# Patient Record
Sex: MALE | Race: WHITE | Employment: UNEMPLOYED | URBAN - NONMETROPOLITAN AREA
[De-identification: names, ages, dates, MRNs, and addresses within clinical notes are randomized per-mention and may not be internally consistent; named-entity substitution may affect disease eponyms.]

---

## 2018-09-24 ENCOUNTER — HOSPITAL ENCOUNTER (EMERGENCY)
Age: 48
Discharge: HOME | End: 2018-09-24
Attending: EMERGENCY MEDICINE
Payer: MEDICAID

## 2018-09-24 ENCOUNTER — APPOINTMENT (OUTPATIENT)
Dept: GENERAL RADIOLOGY | Age: 48
End: 2018-09-24
Payer: MEDICAID

## 2018-09-24 VITALS
HEIGHT: 72 IN | OXYGEN SATURATION: 95 % | DIASTOLIC BLOOD PRESSURE: 95 MMHG | TEMPERATURE: 99 F | HEART RATE: 102 BPM | WEIGHT: 285 LBS | RESPIRATION RATE: 20 BRPM | BODY MASS INDEX: 38.6 KG/M2 | SYSTOLIC BLOOD PRESSURE: 151 MMHG

## 2018-09-24 DIAGNOSIS — M79.671 FOOT PAIN, BILATERAL: ICD-10-CM

## 2018-09-24 DIAGNOSIS — M79.672 FOOT PAIN, BILATERAL: ICD-10-CM

## 2018-09-24 DIAGNOSIS — M79.5 FOREIGN BODY (FB) IN SOFT TISSUE: Primary | ICD-10-CM

## 2018-09-24 PROCEDURE — 99283 EMERGENCY DEPT VISIT LOW MDM: CPT

## 2018-09-24 PROCEDURE — 73620 X-RAY EXAM OF FOOT: CPT

## 2018-09-24 ASSESSMENT — PAIN DESCRIPTION - LOCATION
LOCATION: FOOT
LOCATION: FOOT

## 2018-09-24 ASSESSMENT — PAIN DESCRIPTION - ORIENTATION
ORIENTATION: RIGHT;LEFT
ORIENTATION: RIGHT;LEFT

## 2018-09-24 ASSESSMENT — PAIN DESCRIPTION - ONSET
ONSET: ON-GOING
ONSET: ON-GOING

## 2018-09-24 ASSESSMENT — PAIN DESCRIPTION - PAIN TYPE
TYPE: ACUTE PAIN
TYPE: ACUTE PAIN

## 2018-09-24 ASSESSMENT — PAIN DESCRIPTION - PROGRESSION: CLINICAL_PROGRESSION: GRADUALLY IMPROVING

## 2018-09-24 ASSESSMENT — PAIN DESCRIPTION - DESCRIPTORS
DESCRIPTORS: STABBING
DESCRIPTORS: STABBING;SHARP

## 2018-09-24 ASSESSMENT — PAIN - FUNCTIONAL ASSESSMENT: PAIN_FUNCTIONAL_ASSESSMENT: 0-10

## 2018-09-24 ASSESSMENT — PAIN DESCRIPTION - FREQUENCY
FREQUENCY: INTERMITTENT
FREQUENCY: CONTINUOUS

## 2018-09-24 ASSESSMENT — PAIN SCALES - GENERAL
PAINLEVEL_OUTOF10: 8
PAINLEVEL_OUTOF10: 5

## 2018-09-25 NOTE — ED PROVIDER NOTES
[facility]  eMERGENCY dEPARTMENT eNCOUnter        Pt Name: Shaneka Perez  MRN: [id number]  [place] 1970  Date of evaluation: 9/24/2018  Provider: Carin Bedolla MD  PCP: No primary care provider on file. CHIEF COMPLAINT       Chief Complaint   Patient presents with    Foreign Body     glass in bilateral feet per pt, was seen at [place] earlier this evening       [facility]      Shaneka Perez is a 51 y.o. male  presents with oral foot pain and possible glass in his feet. Stepped on glass several days ago. He has pain in both feet. History of neuropathy in both feet. No discharge. No redness. Seen at the ER earlier today where they try to take out pieces of glass from his foot. Tums or worse when he walks steps. Pain is severe. Nursing notes at the time of the HPI were reviewed. Reviewed notes in the EMR. REVIEW OF SYSTEMS       Pertinent positives and negatives as above per history of present illness. Other systems reviewed and found to be negative to a total of 5 systems reviewed. PAST MEDICAL HISTORY     Past Medical History:   Diagnosis Date    Diabetes mellitus ([address]     Hyperlipidemia     Hypertension          SURGICAL HISTORY       Past Surgical History:   Procedure Laterality Date    CARPAL TUNNEL RELEASE      CYST INCISION AND DRAINAGE      ROTATOR CUFF REPAIR Bilateral          CURRENT MEDICATIONS       Previous Medications    OXYCODONE (ROXICODONE) 5 MG IMMEDIATE RELEASE TABLET    Take 1-2 tablets PO every 4 hours as needed for pain. ALLERGIES     Toradol [ketorolac tromethamine]    FAMILY HISTORY     History reviewed. No pertinent family history.        SOCIAL HISTORY       Social History     Social History    Marital status: Single     Spouse name: N/A    Number of children: N/A    Years of education: N/A     Social History Main Topics    Smoking status: Never Smoker    Smokeless tobacco: Current User glass.  No glass identified using surgical light and magnification. No signs of infection. Signs that foreign bodies were removed or an attempt was made to remove them. Do not feel that patient benefit from blind ×2 remove foreign body. I am unable to locate any foreign body and can see the effects of previous attempts today. Likely to harm patient and not benefit the patient. Recommended that he soak his feet in warm Epsom salts and follow-up with his podiatrist.  Return for signs of infection. Patient may develop infection if he has a foreign body present. Course:  1. No significant change. Felt better after irrigation of feet. Discussed all test results, treatment, clinical impression with patient. Discussed plan of care. Agreed with plan. CRITICAL CARE TIME     N/A       CONSULTS    None      CLINICAL IMPRESSION      1. Foreign body (FB) in soft tissue    2.  Foot pain, bilateral              DISPOSITION:    HOme    PATIENT REFERRED TO:  [facility] G[facility]vara [facility]  [address] , [address]  [phone]            DISCHARGE MEDICATIONS:  New Prescriptions    No medications on file              (Please note that portions of this note were completed with a voice recognition program.  Efforts were made to edit the dictations but occasionally words are mis-transcribed.)    Antonio Avelar MD (electronically signed)             Antonio Avelar MD  09/24/18 0406

## 2019-06-20 ENCOUNTER — APPOINTMENT (OUTPATIENT)
Dept: GENERAL RADIOLOGY | Age: 49
End: 2019-06-20
Payer: MEDICARE

## 2019-06-20 ENCOUNTER — HOSPITAL ENCOUNTER (EMERGENCY)
Age: 49
Discharge: HOME | End: 2019-06-20
Payer: MEDICARE

## 2019-06-20 VITALS
SYSTOLIC BLOOD PRESSURE: 142 MMHG | HEIGHT: 73 IN | HEART RATE: 95 BPM | TEMPERATURE: 98.2 F | BODY MASS INDEX: 37.11 KG/M2 | WEIGHT: 280 LBS | RESPIRATION RATE: 16 BRPM | DIASTOLIC BLOOD PRESSURE: 94 MMHG

## 2019-06-20 DIAGNOSIS — M25.571 PAIN AND SWELLING OF RIGHT ANKLE: ICD-10-CM

## 2019-06-20 DIAGNOSIS — S93.401A SPRAIN OF RIGHT ANKLE, UNSPECIFIED LIGAMENT, INITIAL ENCOUNTER: Primary | ICD-10-CM

## 2019-06-20 DIAGNOSIS — M25.471 PAIN AND SWELLING OF RIGHT ANKLE: ICD-10-CM

## 2019-06-20 PROCEDURE — 73610 X-RAY EXAM OF ANKLE: CPT

## 2019-06-20 PROCEDURE — 99283 EMERGENCY DEPT VISIT LOW MDM: CPT

## 2019-06-20 ASSESSMENT — PAIN DESCRIPTION - PROGRESSION: CLINICAL_PROGRESSION: NOT CHANGED

## 2019-06-20 ASSESSMENT — PAIN SCALES - GENERAL
PAINLEVEL_OUTOF10: 6
PAINLEVEL_OUTOF10: 7

## 2019-06-20 ASSESSMENT — PAIN DESCRIPTION - PAIN TYPE: TYPE: ACUTE PAIN

## 2019-06-20 ASSESSMENT — PAIN DESCRIPTION - ORIENTATION: ORIENTATION: RIGHT

## 2019-06-20 ASSESSMENT — PAIN DESCRIPTION - DESCRIPTORS: DESCRIPTORS: ACHING

## 2019-06-20 ASSESSMENT — PAIN DESCRIPTION - ONSET: ONSET: ON-GOING

## 2019-06-20 ASSESSMENT — PAIN DESCRIPTION - LOCATION: LOCATION: ANKLE

## 2019-06-20 ASSESSMENT — PAIN DESCRIPTION - FREQUENCY: FREQUENCY: CONTINUOUS

## 2019-06-20 NOTE — ED NOTES
Pt instructed to follow up with [facility]. Assessed per Linda Traylor NP.      Gilberto Manning LPN  54/51/29 0586

## 2019-06-20 NOTE — ED PROVIDER NOTES
malleolus. There is no knee tenderness or fibular head tenderness. The ankle joint is intact without excessive opening on stressing. ROM to the right ankle is with limited flexion and extension due to pain. Distal pulses palpable. Capillary refill less than 3 seconds. Full sensation is intact. SKIN: Warm and dry. Skin is intact, no open wounds observed. NEUROLOGICAL: Alert and oriented. Sensation is intact to light touch to the distal tips of the toes on the right foot. PSYCHIATRIC: Mood and affect appropriate. Speech is clear and articulate. LABS  No results found for this visit on 06/20/19. RADIOLOGY  Xr Ankle Left (min 3 Views)    Result Date: 6/20/2019  EXAMINATION: 3 XRAY VIEWS OF THE LEFT ANKLE 6/20/2019 4:23 pm COMPARISON: None. HISTORY: ORDERING SYSTEM PROVIDED HISTORY: pain TECHNOLOGIST PROVIDED HISTORY: Reason for exam:->pain Ordering Physician Provided Reason for Exam: high ankle pain Acuity: Acute Type of Exam: Initial FINDINGS: Frontal, lateral, and oblique views of the ankle are submitted for review. There is no evidence for acute fracture or dislocation. Small calcification anterior to the tibiotalar joint space, possibly intra-articular body. Os trigonum variant noted. Small dorsal calcaneal spur. No acute osseus abnormality of the ankle. No fracture or dislocation. ED COURSE/MDM  Patient seen and evaluated. Old records reviewed. Diagnostic testing reviewed and results discussed. I have evaluated this patient. My supervising physician was available for consultation. Francheska Mcpherson presented to the ED today with above noted complaints. Patient appears well, vital signs are normal. Right ankle physical exam shows swelling and tenderness to the lateral ankle. X-ray obtained and shows no acute findings. I feel patient's symptoms are most consistent with an ankle sprain.  Patient reports that he was given an Ace wrap, he has crutches at home but was not given an Aircast. Patient be placed into an Aircast here in the ED. He will be given follow-up with orthopedics as he may benefit from physical therapy. Under my direction a air cast and ace wrap was placed on the right leg by the ED tech. I have examined the splint thoroughly for functionality. Extremity is distally neurovascularly intact with movement of digits, normal sensation and brisk cap refill. We discussed splint precautions including development of worsening swelling, pain, numbness, tingling, or weakness. At this point I do not feel the patient requires further work up and it is reasonable to discharge the patient. Please refer to AVS for further details of the discharge instructions. A discussion was had with the patient regarding diagnosis, diagnostic testing results, treatment/ plan of care, and follow up. All questions were answered. Patient will follow up as directed for further evaluation/treatment. The patient was given strict return precautions as we discussed symptoms that would necessitate return to the ED. Patient will return to ED for new/worsening symptoms. The patient verbalized their understanding and agreement with the above plan. I estimate there is LOW risk for COMPARTMENT SYNDROME, DEEP VENOUS THROMBOSIS, SEPTIC ARTHRITIS, TENDON OR NEUROVASCULAR INJURY, thus I consider the discharge disposition reasonable. Michael Flanagan and I have discussed the diagnosis and risks, and we agree with discharging home to follow-up with their primary doctor or the referral orthopedist. We also discussed returning to the Emergency Department immediately if new or worsening symptoms occur. We have discussed the symptoms which are most concerning (e.g., changing or worsening pain, numbness, weakness) that necessitate immediate return. Clinial Impression    1. Sprain of right ankle, unspecified ligament, initial encounter    2.  Pain and swelling of right ankle        Blood pressure (!) 142/94, pulse 95,

## 2019-06-25 ENCOUNTER — OFFICE VISIT (OUTPATIENT)
Dept: ORTHOPEDIC SURGERY | Age: 49
End: 2019-06-25
Payer: MEDICARE

## 2019-06-25 ENCOUNTER — TELEPHONE (OUTPATIENT)
Dept: ORTHOPEDIC SURGERY | Age: 49
End: 2019-06-25

## 2019-06-25 VITALS
HEIGHT: 73 IN | BODY MASS INDEX: 37.11 KG/M2 | DIASTOLIC BLOOD PRESSURE: 84 MMHG | HEART RATE: 100 BPM | WEIGHT: 279.98 LBS | SYSTOLIC BLOOD PRESSURE: 132 MMHG

## 2019-06-25 DIAGNOSIS — S93.401A MODERATE ANKLE SPRAIN, RIGHT, INITIAL ENCOUNTER: Primary | ICD-10-CM

## 2019-06-25 PROCEDURE — L4360 PNEUMAT WALKING BOOT PRE CST: HCPCS | Performed by: PODIATRIST

## 2019-06-25 PROCEDURE — G8417 CALC BMI ABV UP PARAM F/U: HCPCS | Performed by: PODIATRIST

## 2019-06-25 PROCEDURE — 4004F PT TOBACCO SCREEN RCVD TLK: CPT | Performed by: PODIATRIST

## 2019-06-25 PROCEDURE — 99203 OFFICE O/P NEW LOW 30 MIN: CPT | Performed by: PODIATRIST

## 2019-06-25 PROCEDURE — G8427 DOCREV CUR MEDS BY ELIG CLIN: HCPCS | Performed by: PODIATRIST

## 2019-06-25 PROCEDURE — 3017F COLORECTAL CA SCREEN DOC REV: CPT | Performed by: PODIATRIST

## 2019-06-25 RX ORDER — DEXTROSE 4 G
TABLET,CHEWABLE ORAL
Refills: 0 | COMMUNITY
Start: 2019-04-21

## 2019-06-25 RX ORDER — CALCIUM CITRATE/VITAMIN D3 200MG-6.25
TABLET ORAL
Refills: 0 | COMMUNITY
Start: 2019-06-14

## 2019-06-25 RX ORDER — IBUPROFEN 800 MG/1
TABLET ORAL
Refills: 0 | COMMUNITY
Start: 2019-06-14 | End: 2019-07-04

## 2019-06-25 RX ORDER — SPIRONOLACTONE 25 MG/1
25 TABLET ORAL DAILY
Refills: 0 | COMMUNITY
Start: 2019-06-17

## 2019-06-25 RX ORDER — GABAPENTIN 600 MG/1
1200 TABLET ORAL 3 TIMES DAILY
Refills: 0 | COMMUNITY
Start: 2019-06-21 | End: 2022-04-28 | Stop reason: ALTCHOICE

## 2019-06-25 RX ORDER — GLUCOSAM/CHON-MSM1/C/MANG/BOSW 500-416.6
TABLET ORAL
Refills: 0 | COMMUNITY
Start: 2019-04-21

## 2019-06-25 RX ORDER — ALBUTEROL SULFATE 90 UG/1
AEROSOL, METERED RESPIRATORY (INHALATION)
Refills: 0 | COMMUNITY
Start: 2019-03-26

## 2019-06-25 RX ORDER — FUROSEMIDE 40 MG/1
TABLET ORAL
Refills: 0 | COMMUNITY
Start: 2019-06-17 | End: 2019-07-04

## 2019-06-25 RX ORDER — ATORVASTATIN CALCIUM 40 MG/1
40 TABLET, FILM COATED ORAL DAILY
Refills: 0 | COMMUNITY
Start: 2019-06-17

## 2019-06-25 RX ORDER — LOSARTAN POTASSIUM 50 MG/1
50 TABLET ORAL DAILY
Refills: 0 | COMMUNITY
Start: 2019-06-17 | End: 2022-04-28 | Stop reason: ALTCHOICE

## 2019-06-25 RX ORDER — GABAPENTIN 400 MG/1
CAPSULE ORAL
Refills: 4 | COMMUNITY
Start: 2019-06-04 | End: 2019-07-04

## 2019-06-25 RX ORDER — CARVEDILOL 6.25 MG/1
6.25 TABLET ORAL 2 TIMES DAILY WITH MEALS
Refills: 0 | COMMUNITY
Start: 2019-06-17

## 2019-06-25 NOTE — PROGRESS NOTES
HISTORY OF PRESENT ILLNESS: This is an initial visit for a 79-year-old male with a chief complaint of right lateral ankle and foot pain. The  patient twisted the ankle and foot almost 2 weeks ago. He states that he heard and felt a very loud popping sensation followed by immediate pain. Pain is present with weightbearing  and twisting motions of the ankle. The pain is best relieved with rest, ice, and  elevation. FAMILY HISTORY:  Documented in chart. SOCIAL HISTORY: Documented in chart. REVIEW OF SYSTEMS: The patient denies any fever, chills, or night sweats. The patient also denies developing any type of rash. The patient denies any problems with cardiovascular, pulmonary, gastrointestinal, neurologic, urologic, genitourinary, psychiatric, dermatologic, and HEENT systems. PHYSICAL EXAMINATION: The area of greatest palpable tenderness is at the  right lateral ankle, over the ATF ligament. An anterior drawer test does  not reproduce any gross instability. There is mild focal edema of the  lateral ankle. No open lesions or fracture blisters are present. He has palpable pedal pulses bilateral.  His sensation is grossly intact bilateral.  There is no pain over the deltoid  ligament. There is no pain over the Achilles tendon and this is palpated to be  intact. Thompsonâs test is negative for a complete rupture of the Achilles  tendon. The patient is able to dorsiflex and plantarflex the foot, as well as  radha and invert independently. RADIOGRAPHS: Three weightbearing x-ray views of the right ankle were evaluated. No acute fractures or dislocations are noted. The ankle joint is in  excellent alignment. ASSESSMENT: Right Lateral Ankle Sprain with ankle pain. PLAN: The patient was educated on the pathology and its treatment options. A high-tide walker was applied to the patientâs right lower extremity.  It was  advised that sleeping with the boot on is desirable for better healing of the  ligament and symptom reduction. The patient is allowed to bear weight as  tolerated. I instructed the patient to remove the boot and put on a regular shoe if driving is attempted. The boot should then be reapplied prior to leaving the vehiicle after driving. Range of motion exercises were prescribed to the patient. Rest, ice,  and elevation are to be used to relieve pain. Overall activity is to be decreased  in the short-term. The patient will return in one week for reevaluation. Procedures    Jase / Eugenie Nunezd Tall Walking Boot     Patient was prescribed a Tall Walking Boot. The right foot will require stabilization / immobilization from this semi-rigid / rigid orthosis to improve their function. The orthosis will assist in protecting the affected area, provide functional support and facilitate healing. Patient was instructed to progress ambulation weight bearing as tolerated in the device. The patient was educated and fit by a healthcare professional with expert knowledge and specialization in brace application while under the direct supervision of the physician. Verbal and written instructions for the use of and application of this item were provided. They were instructed to contact the office immediately should the brace result in increased pain, decreased sensation, increased swelling or worsening of the condition.

## 2019-07-02 ENCOUNTER — OFFICE VISIT (OUTPATIENT)
Dept: ORTHOPEDIC SURGERY | Age: 49
End: 2019-07-02
Payer: MEDICARE

## 2019-07-02 VITALS
HEART RATE: 78 BPM | BODY MASS INDEX: 37.11 KG/M2 | WEIGHT: 279.98 LBS | DIASTOLIC BLOOD PRESSURE: 74 MMHG | HEIGHT: 73 IN | SYSTOLIC BLOOD PRESSURE: 120 MMHG

## 2019-07-02 DIAGNOSIS — S93.401A MODERATE ANKLE SPRAIN, RIGHT, INITIAL ENCOUNTER: Primary | ICD-10-CM

## 2019-07-02 PROCEDURE — 3017F COLORECTAL CA SCREEN DOC REV: CPT | Performed by: PODIATRIST

## 2019-07-02 PROCEDURE — G8427 DOCREV CUR MEDS BY ELIG CLIN: HCPCS | Performed by: PODIATRIST

## 2019-07-02 PROCEDURE — G8417 CALC BMI ABV UP PARAM F/U: HCPCS | Performed by: PODIATRIST

## 2019-07-02 PROCEDURE — 4004F PT TOBACCO SCREEN RCVD TLK: CPT | Performed by: PODIATRIST

## 2019-07-02 PROCEDURE — 99213 OFFICE O/P EST LOW 20 MIN: CPT | Performed by: PODIATRIST

## 2019-07-02 RX ORDER — FUROSEMIDE 40 MG/1
40 TABLET ORAL 2 TIMES DAILY
COMMUNITY
Start: 2019-02-06

## 2019-07-04 ENCOUNTER — HOSPITAL ENCOUNTER (EMERGENCY)
Age: 49
Discharge: HOME | End: 2019-07-04
Payer: MEDICARE

## 2019-07-04 VITALS
HEIGHT: 73 IN | SYSTOLIC BLOOD PRESSURE: 140 MMHG | RESPIRATION RATE: 18 BRPM | DIASTOLIC BLOOD PRESSURE: 88 MMHG | HEART RATE: 103 BPM | WEIGHT: 285 LBS | BODY MASS INDEX: 37.77 KG/M2 | TEMPERATURE: 98.5 F | OXYGEN SATURATION: 98 %

## 2019-07-04 DIAGNOSIS — K08.89 PAIN, DENTAL: Primary | ICD-10-CM

## 2019-07-04 PROCEDURE — 6370000000 HC RX 637 (ALT 250 FOR IP): Performed by: NURSE PRACTITIONER

## 2019-07-04 PROCEDURE — 99282 EMERGENCY DEPT VISIT SF MDM: CPT

## 2019-07-04 RX ORDER — CHLORHEXIDINE GLUCONATE 0.12 MG/ML
15 RINSE ORAL 2 TIMES DAILY
Qty: 420 ML | Refills: 0 | Status: SHIPPED | OUTPATIENT
Start: 2019-07-04 | End: 2019-07-18

## 2019-07-04 RX ORDER — PENICILLIN V POTASSIUM 500 MG/1
500 TABLET ORAL 4 TIMES DAILY
Qty: 40 TABLET | Refills: 0 | Status: SHIPPED | OUTPATIENT
Start: 2019-07-04 | End: 2019-07-14

## 2019-07-04 RX ORDER — LIDOCAINE HYDROCHLORIDE 20 MG/ML
5 SOLUTION OROPHARYNGEAL PRN
Qty: 1 BOTTLE | Refills: 0 | Status: SHIPPED | OUTPATIENT
Start: 2019-07-04 | End: 2021-09-09 | Stop reason: ALTCHOICE

## 2019-07-04 RX ORDER — PENICILLIN V POTASSIUM 250 MG/1
500 TABLET ORAL ONCE
Status: COMPLETED | OUTPATIENT
Start: 2019-07-04 | End: 2019-07-04

## 2019-07-04 RX ORDER — LIDOCAINE HYDROCHLORIDE 20 MG/ML
15 SOLUTION OROPHARYNGEAL ONCE
Status: COMPLETED | OUTPATIENT
Start: 2019-07-04 | End: 2019-07-04

## 2019-07-04 RX ADMIN — LIDOCAINE HYDROCHLORIDE 15 ML: 20 SOLUTION ORAL; TOPICAL at 20:29

## 2019-07-04 RX ADMIN — PENICILLIN V POTASIUM 500 MG: 250 TABLET ORAL at 20:29

## 2019-07-04 ASSESSMENT — ENCOUNTER SYMPTOMS
ABDOMINAL PAIN: 0
SHORTNESS OF BREATH: 0

## 2019-07-04 ASSESSMENT — PAIN SCALES - GENERAL: PAINLEVEL_OUTOF10: 7

## 2019-07-05 NOTE — ED PROVIDER NOTES
ROTATOR CUFF REPAIR Bilateral          CURRENT MEDICATIONS       Previous Medications    ALBUTEROL SULFATE  (90 BASE) MCG/ACT INHALER        ASPIRIN 81 MG TABLET    Take 81 mg by mouth daily    ATORVASTATIN (LIPITOR) 40 MG TABLET        CARVEDILOL (COREG) 6.25 MG TABLET        FUROSEMIDE (LASIX) 40 MG TABLET    Take 40 mg by mouth 2 times daily     GABAPENTIN (NEURONTIN) 600 MG TABLET    1,200 mg 3 times daily. LOSARTAN (COZAAR) 50 MG TABLET        METFORMIN (GLUCOPHAGE) 1000 MG TABLET        RA ALCOHOL SWABS 70 % PADS        SPIRONOLACTONE (ALDACTONE) 25 MG TABLET        TRUE METRIX BLOOD GLUCOSE TEST STRIP        TRUEPLUS LANCETS 33G MISC             ALLERGIES     Toradol [ketorolac tromethamine] and Ketorolac    FAMILY HISTORY     History reviewed. No pertinent family history.        SOCIAL HISTORY       Social History     Socioeconomic History    Marital status: Single     Spouse name: None    Number of children: None    Years of education: None    Highest education level: None   Occupational History    None   Social Needs    Financial resource strain: None    Food insecurity:     Worry: None     Inability: None    Transportation needs:     Medical: None     Non-medical: None   Tobacco Use    Smoking status: Never Smoker    Smokeless tobacco: Current User     Types: Chew   Substance and Sexual Activity    Alcohol use: No    Drug use: Yes     Types: Marijuana     Comment: daily    Sexual activity: Not Currently   Lifestyle    Physical activity:     Days per week: None     Minutes per session: None    Stress: None   Relationships    Social connections:     Talks on phone: None     Gets together: None     Attends [employer] service: None     Active member of club or organization: None     Attends meetings of clubs or organizations: None     Relationship status: None    Intimate partner violence:     Fear of current or ex partner: None     Emotionally abused: None     Physically abused: penicillin v potassium (VEETID) tablet 500 mg (has no administration in time range)   lidocaine viscous hcl (XYLOCAINE) 2 % solution 15 mL (has no administration in time range)       Patient was seen and evaluated by myself. Patient here for tooth pain. Patient reports that he had part of a filling that fell out a few days ago and he has had pain ever since. On exam he is awake and alert hemodynamically stable nontoxic in appearance. He has no abscess present. He will be provided with penicillin and viscous lidocaine in the ED. He will be discharged home with Peridex viscous lidocaine and penicillin. He was given a dental referral.  He was encouraged to follow-up with his primary care doctor the next few days and return to the ED for worsening symptoms. Patient was discharged home with all questions answered. The patient tolerated their visit well. I have evaluated thispatient. My supervising physician was available for consultation. The patient and / or the family were informed of the results of any tests, a time was given to answer questions, a plan was proposed and they agreed Jonny Nicolas. FINAL IMPRESSION      1.  Pain, dental          DISPOSITION/PLAN   DISPOSITION Decision To Discharge 07/04/2019 08:16:18 PM      PATIENT REFERRED TO:  Hipolito Arredondo MD  [address] [place] [address] Dr  [address]  [phone]    Schedule an appointment as soon as possible for a visit in 2 days  for re-evaluation    [facility] [place] [facility]  [address] [facility]  [phone]    If symptoms worsen    see the list      to establish dental care      DISCHARGE MEDICATIONS:  New Prescriptions    CHLORHEXIDINE (PERIDEX) 0.12 % SOLUTION    Take 15 mLs by mouth 2 times daily for 14 days    LIDOCAINE VISCOUS HCL (XYLOCAINE) 2 % SOLN SOLUTION    Take 5 mLs by mouth as needed for Irritation or Dental Pain    PENICILLIN V POTASSIUM (VEETID) 500 MG TABLET    Take 1 tablet by mouth 4 times daily for 10 days

## 2019-07-08 ENCOUNTER — APPOINTMENT (OUTPATIENT)
Dept: GENERAL RADIOLOGY | Age: 49
End: 2019-07-08
Payer: MEDICARE

## 2019-07-08 ENCOUNTER — HOSPITAL ENCOUNTER (EMERGENCY)
Age: 49
Discharge: HOME | End: 2019-07-08
Payer: MEDICARE

## 2019-07-08 VITALS
OXYGEN SATURATION: 100 % | HEIGHT: 72 IN | HEART RATE: 83 BPM | BODY MASS INDEX: 38.6 KG/M2 | DIASTOLIC BLOOD PRESSURE: 94 MMHG | WEIGHT: 285 LBS | SYSTOLIC BLOOD PRESSURE: 151 MMHG | TEMPERATURE: 98 F | RESPIRATION RATE: 18 BRPM

## 2019-07-08 DIAGNOSIS — D16.9 OSTEOCHONDROMA: ICD-10-CM
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
## 2019-07-08 DIAGNOSIS — S80.862A NONVENOMOUS INSECT BITE OF LEFT LOWER EXTREMITY, INITIAL ENCOUNTER: Primary | ICD-10-CM

## 2019-07-08 DIAGNOSIS — L03.116 CELLULITIS OF LEFT LOWER EXTREMITY: ICD-10-CM

## 2019-07-08 PROCEDURE — 6370000000 HC RX 637 (ALT 250 FOR IP): Performed by: PHYSICIAN ASSISTANT

## 2019-07-08 PROCEDURE — 73560 X-RAY EXAM OF KNEE 1 OR 2: CPT

## 2019-07-08 PROCEDURE — 99283 EMERGENCY DEPT VISIT LOW MDM: CPT

## 2019-07-08 RX ORDER — ACETAMINOPHEN 500 MG
500 TABLET ORAL 4 TIMES DAILY PRN
Qty: 120 TABLET | Refills: 0 | Status: SHIPPED | OUTPATIENT
Start: 2019-07-08 | End: 2020-04-13

## 2019-07-08 RX ORDER — SULFAMETHOXAZOLE AND TRIMETHOPRIM 800; 160 MG/1; MG/1
1 TABLET ORAL ONCE
Status: COMPLETED | OUTPATIENT
Start: 2019-07-08 | End: 2019-07-08

## 2019-07-08 RX ORDER — CEPHALEXIN 500 MG/1
500 CAPSULE ORAL 4 TIMES DAILY
Qty: 28 CAPSULE | Refills: 0 | Status: SHIPPED | OUTPATIENT
Start: 2019-07-08 | End: 2019-07-12 | Stop reason: ALTCHOICE

## 2019-07-08 RX ORDER — CEPHALEXIN 500 MG/1
500 CAPSULE ORAL ONCE
Status: COMPLETED | OUTPATIENT
Start: 2019-07-08 | End: 2019-07-08

## 2019-07-08 RX ORDER — ACETAMINOPHEN 500 MG
500 TABLET ORAL ONCE
Status: COMPLETED | OUTPATIENT
Start: 2019-07-08 | End: 2019-07-08

## 2019-07-08 RX ORDER — SULFAMETHOXAZOLE AND TRIMETHOPRIM 800; 160 MG/1; MG/1
1 TABLET ORAL 2 TIMES DAILY
Qty: 14 TABLET | Refills: 0 | Status: SHIPPED | OUTPATIENT
Start: 2019-07-08 | End: 2019-07-12 | Stop reason: SINTOL

## 2019-07-08 RX ADMIN — CEPHALEXIN 500 MG: 500 CAPSULE ORAL at 18:00

## 2019-07-08 RX ADMIN — SULFAMETHOXAZOLE AND TRIMETHOPRIM 1 TABLET: 800; 160 TABLET ORAL at 18:00

## 2019-07-08 RX ADMIN — ACETAMINOPHEN 500 MG: 500 TABLET ORAL at 18:00

## 2019-07-08 ASSESSMENT — ENCOUNTER SYMPTOMS
RESPIRATORY NEGATIVE: 1
COLOR CHANGE: 1
GASTROINTESTINAL NEGATIVE: 1

## 2019-07-08 ASSESSMENT — PAIN DESCRIPTION - PAIN TYPE: TYPE: ACUTE PAIN

## 2019-07-08 ASSESSMENT — PAIN DESCRIPTION - LOCATION: LOCATION: LEG;TEETH

## 2019-07-08 ASSESSMENT — PAIN DESCRIPTION - DESCRIPTORS: DESCRIPTORS: THROBBING

## 2019-07-08 ASSESSMENT — PAIN SCALES - GENERAL: PAINLEVEL_OUTOF10: 8

## 2019-07-08 NOTE — ED PROVIDER NOTES
**[facility]  EMERGENCY [facility]PARTMENT ENCOUNTER      Pt Name: Oscar Byrnes  DP  [place] 1970  Date of evaluation: 2019  Provider: Shree Crane PA-C      Chief Complaint:    Chief Complaint   Patient presents with   [address]     left calf x 30 hours. Leg is painful into knee. Pt states he popped it and noticed red area got bigger       Nursing Notes, Past Medical Hx, Past Surgical Hx, Social Hx, Allergies, and Family Hx were all reviewed and agreed with or any disagreements were addressed in the HPI.    HPI:  (Location, Duration, Timing, Severity,Quality, Assoc Sx, Context, Modifying factors)  This is a  47 y.o. male brought in by private vehicle for complaints of an insect bite to his left side of his lower leg on lateral calf. Onset of symptoms occurred over 30 hours ago. Duration of symptoms have been constant since onset. Patient states he noticed a small pimple and he decided to pop it at home. Patient states that clear liquid came out. No aggravating symptoms. No alleviating symptoms. Patient is diabetic. Patient has not tried anything at this time for symptomatic relief. Patient denies any other complaints.     PastMedical/Surgical History:      Diagnosis Date    CHF (congestive heart failure) (HCC)     Diabetes mellitus ([facility] [address] [facility]     Hyperlipidemia     Hypertension          Procedure Laterality Date    CARPAL TUNNEL RELEASE      CYST INCISION AND DRAINAGE      ROTATOR CUFF REPAIR Bilateral        Medications:  Discharge Medication List as of 2019  6:58 PM      CONTINUE these medications which have NOT CHANGED    Details   aspirin 81 MG tablet Take 81 mg by mouth dailyHistorical Med      penicillin v potassium (VEETID) 500 MG tablet Take 1 tablet by mouth 4 times daily for 10 days, Disp-40 tablet, R-0Print      chlorhexidine (PERIDEX) 0.12 % solution Take 15 mLs by mouth 2 times daily for 14 days, rales. He exhibits no tenderness. Musculoskeletal: Normal range of motion. He exhibits no deformity. Neurological: He is alert and oriented to person, place, and time. Skin: Skin is warm and dry. Lesion noted. He is not diaphoretic. There is erythema. Psychiatric: He has a normal mood and affect. His behavior is normal.   Nursing note and vitals reviewed. MEDICAL DECISION MAKING    Vitals:    Vitals:    07/08/19 1734 07/08/19 1849   BP: (!) 151/94    Pulse: 83    Resp: 18    Temp: 98 °F (36.7 °C)    TempSrc: Oral    SpO2:  100%   Weight: 285 lb (129.3 kg)    Height: 6' (1.829 m)        LABS:Labs Reviewed - No data to display     Remainder of labs reviewed and werenegative at this time or not returned at the time of this note. RADIOLOGY:   Non-plain film images such as CT, Ultrasound and MRI are read by the radiologist. Yuri Hickman PA-C have directly visualized the radiologic plain film image(s) with the below findings:        Interpretation per the Radiologist below, if available at the time of thisnote:    XR KNEE LEFT (1-2 VIEWS)   Preliminary Result   Bony exostosis most compatible with an osteochondroma. If this is focally   tender follow-up MRI would be recommended. Comparison to outside studies if   available would be useful. No results found. MEDICAL DECISION MAKING / ED COURSE:      PROCEDURES:   Procedures    None    Patient was given:  Medications   sulfamethoxazole-trimethoprim (BACTRIM DS;SEPTRA DS) 800-160 MG per tablet 1 tablet (1 tablet Oral Given 7/8/19 1800)   cephALEXin (KEFLEX) capsule 500 mg (500 mg Oral Given 7/8/19 1800)   acetaminophen (TYLENOL) tablet 500 mg (500 mg Oral Given 7/8/19 1800)       Patient brought in for evaluation of an insect bite to lateral aspect of left lower leg. On exam he is alert oriented afebrile breathing on room air satting at 100%. Patient appears nontoxic no respiratory distress.   Old labs and records [id number]  [phone]  Schedule an appointment as soon as possible for a visit   As needed, If symptoms worsen      DISCHARGE MEDICATIONS:  Discharge Medication List as of 7/8/2019  6:58 PM      START taking these medications    Details   sulfamethoxazole-trimethoprim (BACTRIM DS) 800-160 MG per tablet Take 1 tablet by mouth 2 times daily for 7 days, Disp-14 tablet, R-0Print      cephALEXin (KEFLEX) 500 MG capsule Take 1 capsule by mouth 4 times daily for 7 days, Disp-28 capsule, R-0Print      acetaminophen (TYLENOL) 500 MG tablet Take 1 tablet by mouth 4 times daily as needed for Pain, Disp-120 tablet, R-0Print             DISCONTINUED MEDICATIONS:  Discharge Medication List as of 7/8/2019  6:58 PM                 (Please note the MDM and HPI sections of this note were completed with a voice recognition program.  Efforts weremade to edit the dictations but occasionally words are mis-transcribed.)    Electronically signed, Carlos Rush PA-C,          Carlos Rush PA-C  07/08/19 [address] PA-C  07/09/19 4865

## 2019-07-10 ENCOUNTER — HOSPITAL ENCOUNTER (EMERGENCY)
Age: 49
Discharge: HOME | End: 2019-07-10
Attending: EMERGENCY MEDICINE
Payer: MEDICARE

## 2019-07-10 VITALS
WEIGHT: 285 LBS | BODY MASS INDEX: 37.77 KG/M2 | DIASTOLIC BLOOD PRESSURE: 83 MMHG | HEART RATE: 101 BPM | OXYGEN SATURATION: 97 % | SYSTOLIC BLOOD PRESSURE: 135 MMHG | HEIGHT: 73 IN | TEMPERATURE: 97.9 F | RESPIRATION RATE: 18 BRPM

## 2019-07-10 DIAGNOSIS — T78.40XA ALLERGIC REACTION, INITIAL ENCOUNTER: Primary | ICD-10-CM

## 2019-07-10 PROCEDURE — 6360000002 HC RX W HCPCS: Performed by: EMERGENCY MEDICINE

## 2019-07-10 PROCEDURE — 6370000000 HC RX 637 (ALT 250 FOR IP): Performed by: EMERGENCY MEDICINE

## 2019-07-10 PROCEDURE — 99285 EMERGENCY DEPT VISIT HI MDM: CPT

## 2019-07-10 PROCEDURE — 96374 THER/PROPH/DIAG INJ IV PUSH: CPT

## 2019-07-10 RX ORDER — LORAZEPAM 1 MG/1
1 TABLET ORAL ONCE
Status: COMPLETED | OUTPATIENT
Start: 2019-07-10 | End: 2019-07-10

## 2019-07-10 RX ORDER — CLINDAMYCIN HYDROCHLORIDE 300 MG/1
300 CAPSULE ORAL 2 TIMES DAILY
Qty: 14 CAPSULE | Refills: 0 | Status: SHIPPED | OUTPATIENT
Start: 2019-07-10 | End: 2019-07-12 | Stop reason: SDUPTHER

## 2019-07-10 RX ORDER — DEXAMETHASONE SODIUM PHOSPHATE 10 MG/ML
10 INJECTION INTRAMUSCULAR; INTRAVENOUS ONCE
Status: COMPLETED | OUTPATIENT
Start: 2019-07-10 | End: 2019-07-10

## 2019-07-10 RX ADMIN — DEXAMETHASONE SODIUM PHOSPHATE 10 MG: 10 INJECTION, SOLUTION INTRAMUSCULAR; INTRAVENOUS at 01:29

## 2019-07-10 RX ADMIN — LORAZEPAM 1 MG: 1 TABLET ORAL at 02:28

## 2019-07-10 NOTE — ED PROVIDER NOTES
MEDICATIONS       Previous Medications    ACETAMINOPHEN (TYLENOL) 500 MG TABLET    Take 1 tablet by mouth 4 times daily as needed for Pain    ALBUTEROL SULFATE  (90 BASE) MCG/ACT INHALER        ASPIRIN 81 MG TABLET    Take 81 mg by mouth daily    ATORVASTATIN (LIPITOR) 40 MG TABLET        CARVEDILOL (COREG) 6.25 MG TABLET        CEPHALEXIN (KEFLEX) 500 MG CAPSULE    Take 1 capsule by mouth 4 times daily for 7 days    CHLORHEXIDINE (PERIDEX) 0.12 % SOLUTION    Take 15 mLs by mouth 2 times daily for 14 days    FUROSEMIDE (LASIX) 40 MG TABLET    Take 40 mg by mouth 2 times daily     GABAPENTIN (NEURONTIN) 600 MG TABLET    1,200 mg 3 times daily. LIDOCAINE VISCOUS HCL (XYLOCAINE) 2 % SOLN SOLUTION    Take 5 mLs by mouth as needed for Irritation or Dental Pain    LOSARTAN (COZAAR) 50 MG TABLET        METFORMIN (GLUCOPHAGE) 1000 MG TABLET        PENICILLIN V POTASSIUM (VEETID) 500 MG TABLET    Take 1 tablet by mouth 4 times daily for 10 days    RA ALCOHOL SWABS 70 % PADS        SPIRONOLACTONE (ALDACTONE) 25 MG TABLET        SULFAMETHOXAZOLE-TRIMETHOPRIM (BACTRIM DS) 800-160 MG PER TABLET    Take 1 tablet by mouth 2 times daily for 7 days    TRUE METRIX BLOOD GLUCOSE TEST STRIP        TRUEPLUS LANCETS 33G MISC           ALLERGIES     Toradol [ketorolac tromethamine] and Ketorolac    FAMILY HISTORY     History reviewed. No pertinent family history.        SOCIAL HISTORY       Social History     Socioeconomic History    Marital status: Single     Spouse name: None    Number of children: None    Years of education: None    Highest education level: None   Occupational History    None   Social Needs    Financial resource strain: None    Food insecurity:     Worry: None     Inability: None    Transportation needs:     Medical: None     Non-medical: None   Tobacco Use    Smoking status: Never Smoker    Smokeless tobacco: Current User     Types: Chew   Substance and Sexual Activity    Alcohol use: No    Drug

## 2019-07-12 ENCOUNTER — HOSPITAL ENCOUNTER (EMERGENCY)
Age: 49
Discharge: HOME | End: 2019-07-12
Attending: EMERGENCY MEDICINE
Payer: MEDICARE

## 2019-07-12 VITALS
SYSTOLIC BLOOD PRESSURE: 145 MMHG | RESPIRATION RATE: 16 BRPM | DIASTOLIC BLOOD PRESSURE: 97 MMHG | HEIGHT: 73 IN | HEART RATE: 107 BPM | BODY MASS INDEX: 27.17 KG/M2 | TEMPERATURE: 98.5 F | WEIGHT: 205 LBS | OXYGEN SATURATION: 97 %

## 2019-07-12 DIAGNOSIS — L02.419 CELLULITIS AND ABSCESS OF LEG: Primary | ICD-10-CM

## 2019-07-12 DIAGNOSIS — L03.119 CELLULITIS AND ABSCESS OF LEG: Primary | ICD-10-CM

## 2019-07-12 LAB
A/G RATIO: 1.3 (ref 1.1–2.2)
ALBUMIN SERPL-MCNC: 4.2 G/DL (ref 3.4–5)
ALP BLD-CCNC: 80 U/L (ref 40–129)
ALT SERPL-CCNC: 27 U/L (ref 10–40)
ANION GAP SERPL CALCULATED.3IONS-SCNC: 16 MMOL/L (ref 3–16)
AST SERPL-CCNC: 19 U/L (ref 15–37)
BASOPHILS ABSOLUTE: 0.1 K/UL (ref 0–0.2)
BASOPHILS RELATIVE PERCENT: 0.8 %
BILIRUB SERPL-MCNC: 0.4 MG/DL (ref 0–1)
BUN BLDV-MCNC: 19 MG/DL (ref 7–20)
CALCIUM SERPL-MCNC: 9.8 MG/DL (ref 8.3–10.6)
CHLORIDE BLD-SCNC: 95 MMOL/L (ref 99–110)
CO2: 22 MMOL/L (ref 21–32)
CREAT SERPL-MCNC: 1 MG/DL (ref 0.9–1.3)
EOSINOPHILS ABSOLUTE: 0.3 K/UL (ref 0–0.6)
EOSINOPHILS RELATIVE PERCENT: 2 %
GFR AFRICAN AMERICAN: >60
GFR NON-AFRICAN AMERICAN: >60
GLOBULIN: 3.3 G/DL
GLUCOSE BLD-MCNC: 248 MG/DL (ref 70–99)
HCT VFR BLD CALC: 44.9 % (ref 40.5–52.5)
HEMOGLOBIN: 15.5 G/DL (ref 13.5–17.5)
LYMPHOCYTES ABSOLUTE: 2.5 K/UL (ref 1–5.1)
LYMPHOCYTES RELATIVE PERCENT: 18.9 %
MCH RBC QN AUTO: 31.9 PG (ref 26–34)
MCHC RBC AUTO-ENTMCNC: 34.5 G/DL (ref 31–36)
MCV RBC AUTO: 92.6 FL (ref 80–100)
MONOCYTES ABSOLUTE: 0.8 K/UL (ref 0–1.3)
MONOCYTES RELATIVE PERCENT: 6.4 %
NEUTROPHILS ABSOLUTE: 9.4 K/UL (ref 1.7–7.7)
NEUTROPHILS RELATIVE PERCENT: 71.9 %
PDW BLD-RTO: 14.4 % (ref 12.4–15.4)
PLATELET # BLD: 254 K/UL (ref 135–450)
PMV BLD AUTO: 9.1 FL (ref 5–10.5)
POTASSIUM REFLEX MAGNESIUM: 3.7 MMOL/L (ref 3.5–5.1)
RBC # BLD: 4.85 M/UL (ref 4.2–5.9)
SODIUM BLD-SCNC: 133 MMOL/L (ref 136–145)
TOTAL PROTEIN: 7.5 G/DL (ref 6.4–8.2)
WBC # BLD: 13.1 K/UL (ref 4–11)

## 2019-07-12 PROCEDURE — 87205 SMEAR GRAM STAIN: CPT

## 2019-07-12 PROCEDURE — 6370000000 HC RX 637 (ALT 250 FOR IP): Performed by: EMERGENCY MEDICINE

## 2019-07-12 PROCEDURE — 87077 CULTURE AEROBIC IDENTIFY: CPT

## 2019-07-12 PROCEDURE — 4500000023 HC ED LEVEL 3 PROCEDURE

## 2019-07-12 PROCEDURE — 80053 COMPREHEN METABOLIC PANEL: CPT

## 2019-07-12 PROCEDURE — 99283 EMERGENCY DEPT VISIT LOW MDM: CPT

## 2019-07-12 PROCEDURE — 85025 COMPLETE CBC W/AUTO DIFF WBC: CPT

## 2019-07-12 PROCEDURE — 87186 SC STD MICRODIL/AGAR DIL: CPT

## 2019-07-12 PROCEDURE — 87070 CULTURE OTHR SPECIMN AEROBIC: CPT

## 2019-07-12 RX ORDER — IBUPROFEN 800 MG/1
800 TABLET ORAL ONCE
Status: DISCONTINUED | OUTPATIENT
Start: 2019-07-12 | End: 2019-07-12 | Stop reason: HOSPADM

## 2019-07-12 RX ORDER — CLINDAMYCIN HYDROCHLORIDE 150 MG/1
600 CAPSULE ORAL ONCE
Status: COMPLETED | OUTPATIENT
Start: 2019-07-12 | End: 2019-07-12

## 2019-07-12 RX ORDER — CLINDAMYCIN HYDROCHLORIDE 300 MG/1
300 CAPSULE ORAL 4 TIMES DAILY
Qty: 40 CAPSULE | Refills: 0 | Status: SHIPPED | OUTPATIENT
Start: 2019-07-12 | End: 2019-07-22

## 2019-07-12 RX ADMIN — CLINDAMYCIN HYDROCHLORIDE 600 MG: 150 CAPSULE ORAL at 14:32

## 2019-07-12 ASSESSMENT — PAIN DESCRIPTION - PAIN TYPE
TYPE: ACUTE PAIN
TYPE: ACUTE PAIN

## 2019-07-12 ASSESSMENT — PAIN DESCRIPTION - ORIENTATION
ORIENTATION: LEFT;OUTER
ORIENTATION: LEFT

## 2019-07-12 ASSESSMENT — PAIN SCALES - GENERAL
PAINLEVEL_OUTOF10: 9
PAINLEVEL_OUTOF10: 5
PAINLEVEL_OUTOF10: 5

## 2019-07-12 ASSESSMENT — PAIN DESCRIPTION - LOCATION
LOCATION: LEG
LOCATION: LEG

## 2019-07-12 ASSESSMENT — PAIN DESCRIPTION - DESCRIPTORS: DESCRIPTORS: THROBBING

## 2019-07-14 LAB
GRAM STAIN RESULT: ABNORMAL
ORGANISM: ABNORMAL
WOUND/ABSCESS: ABNORMAL
WOUND/ABSCESS: ABNORMAL

## 2019-07-24 ENCOUNTER — OFFICE VISIT (OUTPATIENT)
Dept: ORTHOPEDIC SURGERY | Age: 49
End: 2019-07-24
Payer: MEDICARE

## 2019-07-24 VITALS — WEIGHT: 279 LBS | BODY MASS INDEX: 37.79 KG/M2 | HEIGHT: 72 IN

## 2019-07-24 DIAGNOSIS — R20.0 BILATERAL HAND NUMBNESS: ICD-10-CM

## 2019-07-24 DIAGNOSIS — M79.641 PAIN IN BOTH HANDS: Primary | ICD-10-CM

## 2019-07-24 DIAGNOSIS — M79.642 PAIN IN BOTH HANDS: Primary | ICD-10-CM

## 2019-07-24 PROCEDURE — G8417 CALC BMI ABV UP PARAM F/U: HCPCS | Performed by: ORTHOPAEDIC SURGERY

## 2019-07-24 PROCEDURE — 3017F COLORECTAL CA SCREEN DOC REV: CPT | Performed by: ORTHOPAEDIC SURGERY

## 2019-07-24 PROCEDURE — 4004F PT TOBACCO SCREEN RCVD TLK: CPT | Performed by: ORTHOPAEDIC SURGERY

## 2019-07-24 PROCEDURE — 99213 OFFICE O/P EST LOW 20 MIN: CPT | Performed by: ORTHOPAEDIC SURGERY

## 2019-07-24 PROCEDURE — G8428 CUR MEDS NOT DOCUMENT: HCPCS | Performed by: ORTHOPAEDIC SURGERY

## 2019-10-17 ENCOUNTER — HOSPITAL ENCOUNTER (EMERGENCY)
Age: 49
Discharge: HOME | End: 2019-10-17
Payer: MEDICARE

## 2019-10-17 ENCOUNTER — APPOINTMENT (OUTPATIENT)
Dept: GENERAL RADIOLOGY | Age: 49
End: 2019-10-17
Payer: MEDICARE

## 2019-10-17 VITALS
DIASTOLIC BLOOD PRESSURE: 105 MMHG | TEMPERATURE: 98.3 F | BODY MASS INDEX: 35.12 KG/M2 | HEART RATE: 98 BPM | OXYGEN SATURATION: 99 % | RESPIRATION RATE: 18 BRPM | WEIGHT: 265 LBS | HEIGHT: 73 IN | SYSTOLIC BLOOD PRESSURE: 137 MMHG

## 2019-10-17 DIAGNOSIS — S93.401A SPRAIN OF RIGHT ANKLE, UNSPECIFIED LIGAMENT, INITIAL ENCOUNTER: Primary | ICD-10-CM

## 2019-10-17 PROCEDURE — 6370000000 HC RX 637 (ALT 250 FOR IP): Performed by: PHYSICIAN ASSISTANT

## 2019-10-17 PROCEDURE — 73630 X-RAY EXAM OF FOOT: CPT

## 2019-10-17 PROCEDURE — 99283 EMERGENCY DEPT VISIT LOW MDM: CPT

## 2019-10-17 PROCEDURE — 73610 X-RAY EXAM OF ANKLE: CPT

## 2019-10-17 RX ORDER — ACETAMINOPHEN 500 MG
500 TABLET ORAL ONCE
Status: COMPLETED | OUTPATIENT
Start: 2019-10-17 | End: 2019-10-17

## 2019-10-17 RX ORDER — ACETAMINOPHEN 500 MG
500 TABLET ORAL 4 TIMES DAILY PRN
Qty: 120 TABLET | Refills: 0 | Status: SHIPPED | OUTPATIENT
Start: 2019-10-17 | End: 2020-04-13

## 2019-10-17 RX ADMIN — ACETAMINOPHEN 500 MG: 500 TABLET ORAL at 15:14

## 2019-10-17 ASSESSMENT — PAIN DESCRIPTION - PAIN TYPE: TYPE: ACUTE PAIN

## 2019-10-17 ASSESSMENT — PAIN DESCRIPTION - DESCRIPTORS: DESCRIPTORS: BURNING

## 2019-10-17 ASSESSMENT — PAIN SCALES - GENERAL
PAINLEVEL_OUTOF10: 7
PAINLEVEL_OUTOF10: 7

## 2019-10-17 ASSESSMENT — PAIN DESCRIPTION - ORIENTATION: ORIENTATION: RIGHT

## 2019-10-17 ASSESSMENT — PAIN DESCRIPTION - LOCATION: LOCATION: ANKLE

## 2019-10-21 ENCOUNTER — HOSPITAL ENCOUNTER (OUTPATIENT)
Dept: MRI IMAGING | Age: 49
Discharge: HOME | End: 2019-10-21
Payer: MEDICARE

## 2019-10-21 DIAGNOSIS — S86.312A: ICD-10-CM

## 2019-10-21 PROCEDURE — 73721 MRI JNT OF LWR EXTRE W/O DYE: CPT

## 2020-01-12 ENCOUNTER — HOSPITAL ENCOUNTER (EMERGENCY)
Age: 50
Discharge: HOME | End: 2020-01-12
Payer: MEDICARE

## 2020-01-12 VITALS
RESPIRATION RATE: 16 BRPM | DIASTOLIC BLOOD PRESSURE: 82 MMHG | SYSTOLIC BLOOD PRESSURE: 132 MMHG | OXYGEN SATURATION: 96 % | BODY MASS INDEX: 37.11 KG/M2 | WEIGHT: 280 LBS | HEIGHT: 73 IN | HEART RATE: 80 BPM | TEMPERATURE: 98.8 F

## 2020-01-12 PROCEDURE — 99282 EMERGENCY DEPT VISIT SF MDM: CPT

## 2020-01-12 PROCEDURE — 6370000000 HC RX 637 (ALT 250 FOR IP): Performed by: PHYSICIAN ASSISTANT

## 2020-01-12 RX ORDER — CLINDAMYCIN HYDROCHLORIDE 150 MG/1
450 CAPSULE ORAL 3 TIMES DAILY
Qty: 90 CAPSULE | Refills: 0 | Status: SHIPPED | OUTPATIENT
Start: 2020-01-12 | End: 2020-01-22

## 2020-01-12 RX ORDER — CLINDAMYCIN HYDROCHLORIDE 150 MG/1
450 CAPSULE ORAL ONCE
Status: COMPLETED | OUTPATIENT
Start: 2020-01-12 | End: 2020-01-12

## 2020-01-12 RX ORDER — LIDOCAINE HYDROCHLORIDE 20 MG/ML
15 SOLUTION OROPHARYNGEAL PRN
Qty: 1 BOTTLE | Refills: 0 | Status: SHIPPED | OUTPATIENT
Start: 2020-01-12 | End: 2022-04-28 | Stop reason: ALTCHOICE

## 2020-01-12 RX ORDER — ACETAMINOPHEN 500 MG
500 TABLET ORAL 4 TIMES DAILY PRN
Qty: 120 TABLET | Refills: 0 | Status: SHIPPED | OUTPATIENT
Start: 2020-01-12

## 2020-01-12 RX ORDER — ACETAMINOPHEN 500 MG
500 TABLET ORAL ONCE
Status: COMPLETED | OUTPATIENT
Start: 2020-01-12 | End: 2020-01-12

## 2020-01-12 RX ORDER — LIDOCAINE HYDROCHLORIDE 20 MG/ML
15 SOLUTION OROPHARYNGEAL ONCE
Status: COMPLETED | OUTPATIENT
Start: 2020-01-12 | End: 2020-01-12

## 2020-01-12 RX ORDER — CLINDAMYCIN HYDROCHLORIDE 150 MG/1
300 CAPSULE ORAL ONCE
Status: DISCONTINUED | OUTPATIENT
Start: 2020-01-12 | End: 2020-01-12

## 2020-01-12 RX ADMIN — LIDOCAINE HYDROCHLORIDE 15 ML: 20 SOLUTION ORAL; TOPICAL at 21:13

## 2020-01-12 RX ADMIN — CLINDAMYCIN HYDROCHLORIDE 450 MG: 150 CAPSULE ORAL at 21:41

## 2020-01-12 RX ADMIN — CLINDAMYCIN HYDROCHLORIDE 300 MG: 150 CAPSULE ORAL at 21:13

## 2020-01-12 RX ADMIN — ACETAMINOPHEN 500 MG: 500 TABLET ORAL at 21:13

## 2020-01-12 ASSESSMENT — PAIN SCALES - GENERAL: PAINLEVEL_OUTOF10: 7

## 2020-01-13 ASSESSMENT — ENCOUNTER SYMPTOMS
RESPIRATORY NEGATIVE: 1
EYES NEGATIVE: 1
GASTROINTESTINAL NEGATIVE: 1

## 2020-01-13 NOTE — ED PROVIDER NOTES
**[facility]  [facility]ERGENCY [facility]PARTMENT [facility]NCOUNTER      Pt Name: Alicia Ibarra  [id number]  [place] 1970  Date of evaluation: 1/12/2020  Provider: Michelle Austin PA-C      Chief Complaint:    Chief Complaint   Patient presents with    Dental Pain     Pt here with tooth pain and abscess on left upper teeth x 1 week       Nursing Notes, Past Medical Hx, Past Surgical Hx, Social Hx, Allergies, and Family Hx were all reviewed and agreed with or any disagreements were addressed in the HPI.    HPI:  (Location, Duration, Timing, Severity, Quality, Assoc Sx, Context, Modifying factors)  This is a  47 y.o. male patient brought in today by private vehicle for complaints of left upper canine dental pain. Onset of symptoms started several days ago. Duration of symptoms have been persistent since onset. No context. No aggravating or alleviating complaints. He rates his pain a 7 out of 10 without any radiation of pain. Nothing seems to make his symptoms better or worse. Patient has tried Excedrin over-the-counter with little to no relief. He has not tried anything else at this time. He denies any fevers, chills, nausea, vomiting or abdominal pain. Denies any difficulty swallowing or pharyngitis. Denies any chest pain or shortness of breath. Patient reports that this is never happened before. Patient denies any other concerns at this time. PastMedical/Surgical History:      Diagnosis Date    CHF (congestive heart failure) (HCC)     Diabetes mellitus ([address]     Hyperlipidemia     Hypertension     MRSA (methicillin resistant staph aureus) culture positive 7/12/019    + left leg bug bite.          Procedure Laterality Date    CARPAL TUNNEL RELEASE      CYST INCISION AND DRAINAGE      ROTATOR CUFF REPAIR Bilateral        Medications:  Discharge Medication List as of 1/12/2020  9:37 PM      CONTINUE these medications which have NOT CHANGED is awake. Appearance: Normal appearance. He is well-developed and overweight. He is not ill-appearing, toxic-appearing or diaphoretic. HENT:      Head: Normocephalic and atraumatic. Nose: Nose normal.      Mouth/Throat:      Lips: Pink. Mouth: Mucous membranes are moist.      Dentition: Abnormal dentition. Dental caries and dental abscesses present. No dental tenderness. Tongue: No lesions. Palate: No mass and lesions. Pharynx: Oropharynx is clear. Uvula midline. No pharyngeal swelling, oropharyngeal exudate, posterior oropharyngeal erythema or uvula swelling. Tonsils: No tonsillar exudate or tonsillar abscesses. Swellin on the right. 0 on the left. Comments: There is no swelling noted to the tongue or the floor of the mouth. Uvula is midline without any deviation. Patient's neck is supple without any lymphadenopathy. There is no tripod positioning, drooling or voice change noted on exam.  Eyes:      General: Lids are normal.         Right eye: No discharge. Left eye: No discharge. Extraocular Movements: Extraocular movements intact. Right eye: Normal extraocular motion and no nystagmus. Left eye: Normal extraocular motion and no nystagmus. Conjunctiva/sclera: Conjunctivae normal.      Pupils: Pupils are equal, round, and reactive to light. Pupils are equal.   Neck:      Musculoskeletal: Normal range of motion and neck supple. Meningeal: Brudzinski's sign and Kernig's sign absent. Cardiovascular:      Rate and Rhythm: Normal rate and regular rhythm. Pulses:           Radial pulses are 2+ on the right side and 2+ on the left side. Heart sounds: Normal heart sounds. No murmur. No gallop. Pulmonary:      Effort: Pulmonary effort is normal. No respiratory distress. Breath sounds: Normal breath sounds. No decreased breath sounds, wheezing, rhonchi or rales. Chest:      Chest wall: No tenderness.    Abdominal: General: Abdomen is flat. Bowel sounds are normal.      Palpations: Abdomen is soft. Tenderness: There is no right CVA tenderness, left CVA tenderness, guarding or rebound. Musculoskeletal: Normal range of motion. General: No deformity. Skin:     General: Skin is warm and dry. Neurological:      General: No focal deficit present. Mental Status: He is alert and oriented to person, place, and time. GCS: GCS eye subscore is 4. GCS verbal subscore is 5. GCS motor subscore is 6. Psychiatric:         Behavior: Behavior normal. Behavior is cooperative. MEDICAL DECISION MAKING    Vitals:    Vitals:    01/12/20 2004   BP: 132/82   Pulse: 80   Resp: 16   Temp: 98.8 °F (37.1 °C)   TempSrc: Temporal   SpO2: 96%   Weight: 280 lb (127 kg)   Height: 6' 1\" (1.854 m)       LABS:Labs Reviewed - No data to display     Remainder of labs reviewed and werenegative at this time or not returned at the time of this note. RADIOLOGY:   Non-plain film images such as CT, Ultrasound and MRI are read by the radiologist. Georgie Randolph PA-C have directly visualized the radiologic plain film image(s) with the below findings:        Interpretation per the Radiologist below, if available at the time of this note:    No orders to display        No results found. MEDICAL DECISION MAKING / ED COURSE:      PROCEDURES:   Procedures    None    Patient was given:  Medications   lidocaine viscous hcl (XYLOCAINE) 2 % solution 15 mL (15 mLs Mouth/Throat Given 1/12/20 2113)   acetaminophen (TYLENOL) tablet 500 mg (500 mg Oral Given 1/12/20 2113)   clindamycin (CLEOCIN) capsule 450 mg (450 mg Oral Given 1/12/20 2141)       Rosas Denny in today for complaints of dental pain. On exam is alert oriented afebrile breathing on room air satting at 96%. Patient appears nontoxic no acute respiratory distress. Old labs and records reviewed. Patient given viscous lidocaine Tylenol and clindamycin.   Patient given follow-up instructions for the dentist.  He will be discharged with Tylenol viscous lidocaine and clindamycin for home. He was told return immediately to the ER with any new or worsening symptoms including but not limited to high fevers at home intractable nausea and vomiting increased pain or any new symptoms he may experience. Patient verbalized understanding of this plan and was comfortable and stable at time of discharge. I did feel comfortable sending this patient home with close follow-up instructions and strict return precautions. The patient tolerated their visit well. I evaluated the patient. The physician was available for consultation as needed. The patient and / or the family were informed of the results of any tests, a time was given to answer questions, a plan was proposed and they agreed with plan. CLINICAL IMPRESSION:  1. Dental abscess    2. Pain due to dental caries        DISPOSITION Decision To Discharge 01/12/2020 09:00:16 PM      PATIENT REFERRED TO:  [place] [facility]  [address] [place] [address]  Schedule an appointment as soon as possible for a visit   As needed, If symptoms worsen      DISCHARGE MEDICATIONS:  Discharge Medication List as of 1/12/2020  9:37 PM      START taking these medications    Details   clindamycin (CLEOCIN) 150 MG capsule Take 3 capsules by mouth 3 times daily for 10 days, Disp-90 capsule, R-0Print      !! acetaminophen (TYLENOL) 500 MG tablet Take 1 tablet by mouth 4 times daily as needed for Pain, Disp-120 tablet, R-0Print      !! lidocaine viscous hcl (XYLOCAINE) 2 % SOLN solution Take 15 mLs by mouth as needed for Irritation, Disp-1 Bottle, R-0Print       !! - Potential duplicate medications found. Please discuss with provider.           DISCONTINUED MEDICATIONS:  Discharge Medication List as of 1/12/2020  9:37 PM      STOP taking these medications       aspirin 81 MG tablet Comments:   Reason for Stopping: (Please note the MDM and HPI sections of this note were completed with a voice recognition program.  Efforts were made to edit the dictations but occasionally words are mis-transcribed.)    Electronically signed, Danie Braxton PA-C,           Danie Braxton PA-C  01/13/20 0161

## 2020-02-24 ENCOUNTER — HOSPITAL ENCOUNTER (EMERGENCY)
Age: 50
Discharge: HOME | End: 2020-02-25
Attending: EMERGENCY MEDICINE
Payer: MEDICARE

## 2020-02-24 VITALS
WEIGHT: 280 LBS | HEIGHT: 73 IN | RESPIRATION RATE: 15 BRPM | TEMPERATURE: 98.3 F | OXYGEN SATURATION: 100 % | DIASTOLIC BLOOD PRESSURE: 99 MMHG | HEART RATE: 113 BPM | SYSTOLIC BLOOD PRESSURE: 174 MMHG | BODY MASS INDEX: 37.11 KG/M2

## 2020-02-24 PROCEDURE — 99284 EMERGENCY DEPT VISIT MOD MDM: CPT

## 2020-02-24 ASSESSMENT — PAIN SCALES - GENERAL: PAINLEVEL_OUTOF10: 9

## 2020-02-24 ASSESSMENT — PAIN DESCRIPTION - LOCATION: LOCATION: BACK;NECK;SHOULDER;HEAD

## 2020-02-25 ENCOUNTER — APPOINTMENT (OUTPATIENT)
Dept: GENERAL RADIOLOGY | Age: 50
End: 2020-02-25
Payer: MEDICARE

## 2020-02-25 ENCOUNTER — APPOINTMENT (OUTPATIENT)
Dept: CT IMAGING | Age: 50
End: 2020-02-25
Payer: MEDICARE

## 2020-02-25 PROCEDURE — 72125 CT NECK SPINE W/O DYE: CPT

## 2020-02-25 PROCEDURE — 71101 X-RAY EXAM UNILAT RIBS/CHEST: CPT

## 2020-02-25 PROCEDURE — 73070 X-RAY EXAM OF ELBOW: CPT

## 2020-02-25 PROCEDURE — 6370000000 HC RX 637 (ALT 250 FOR IP): Performed by: EMERGENCY MEDICINE

## 2020-02-25 PROCEDURE — 73010 X-RAY EXAM OF SHOULDER BLADE: CPT

## 2020-02-25 PROCEDURE — 70450 CT HEAD/BRAIN W/O DYE: CPT

## 2020-02-25 RX ORDER — CYCLOBENZAPRINE HCL 10 MG
10 TABLET ORAL ONCE
Status: COMPLETED | OUTPATIENT
Start: 2020-02-25 | End: 2020-02-25

## 2020-02-25 RX ORDER — CYCLOBENZAPRINE HCL 10 MG
10 TABLET ORAL 3 TIMES DAILY PRN
Qty: 21 TABLET | Refills: 0 | Status: SHIPPED | OUTPATIENT
Start: 2020-02-25 | End: 2020-03-03

## 2020-02-25 RX ORDER — TRAMADOL HYDROCHLORIDE 50 MG/1
50 TABLET ORAL ONCE
Status: COMPLETED | OUTPATIENT
Start: 2020-02-25 | End: 2020-02-25

## 2020-02-25 RX ORDER — TRAMADOL HYDROCHLORIDE 50 MG/1
50 TABLET ORAL EVERY 6 HOURS PRN
Qty: 8 TABLET | Refills: 0 | Status: SHIPPED | OUTPATIENT
Start: 2020-02-25 | End: 2020-02-27

## 2020-02-25 RX ORDER — NAPROXEN 500 MG/1
500 TABLET ORAL 2 TIMES DAILY PRN
Qty: 20 TABLET | Refills: 0 | Status: SHIPPED | OUTPATIENT
Start: 2020-02-25 | End: 2020-04-13 | Stop reason: SDUPTHER

## 2020-02-25 RX ADMIN — TRAMADOL HYDROCHLORIDE 50 MG: 50 TABLET, FILM COATED ORAL at 01:01

## 2020-02-25 RX ADMIN — CYCLOBENZAPRINE 10 MG: 10 TABLET, FILM COATED ORAL at 01:01

## 2020-02-25 ASSESSMENT — PAIN SCALES - GENERAL: PAINLEVEL_OUTOF10: 9

## 2020-02-25 NOTE — ED NOTES
Patient provided with discharge instructions and any prescriptions. Follow-up reviewed with patient. No further questions verbalized at this time. Vital signs and patient stable upon discharge.         Casper Knox RN  02/25/20 [id number]

## 2020-02-25 NOTE — DISCHARGE INSTR - COC
Continuity of Care Form    Patient Name: Alexi Weems   :  1970  MRN:  [id number]    Admit date:  2020  Discharge date:  ***    Code Status Order: No Order   Advance Directives:     Admitting Physician:  No admitting provider for patient encounter. PCP: No primary care provider on file. Discharging Nurse: [facility] Unit/Room#:   Discharging Unit Phone Number: ***    Emergency Contact:   Extended Emergency Contact Information  Primary Emergency Contact: Isaiah Barragan  Home Phone: [phone]  Relation: Parent    Past Surgical History:  Past Surgical History:   Procedure Laterality Date    CARPAL TUNNEL RELEASE      CYST INCISION AND DRAINAGE      ROTATOR CUFF REPAIR Bilateral        Immunization History: There is no immunization history on file for this patient. Active Problems: There is no problem list on file for this patient.       Isolation/Infection:   Isolation          No Isolation        Patient Infection Status     Infection Onset Added Last Indicated Last Indicated By Review Planned Expiration Resolved Resolved By    [facility] 19 Wound Culture              Nurse Assessment:  Last Vital Signs: BP (!) 174/99   Pulse 113   Temp 98.3 °F (36.8 °C) (Oral)   Resp 15   Ht 6' 1\" (1.854 m)   Wt 280 lb (127 kg)   SpO2 100%   BMI 36.94 kg/m²     Last documented pain score (0-10 scale): Pain Level: 9  Last Weight:   Wt Readings from Last 1 Encounters:   20 280 lb (127 kg)     Mental Status:  {IP PT MENTAL STATUS:15275}    IV Access:  { PETR IV ACCESS:580511952}    Nursing Mobility/ADLs:  Walking   {CHP DME DTZE:495801079}  Transfer  {CHP DME TRZJ:916880209}  Bathing  {CHP DME SDXD:278380561}  Dressing  {CHP DME XFYV:887269220}  Toileting  {CHP DME JJLP:765225382}  Feeding  {CHP DME NEMB:783279763}  Med Admin  {CHP DME HOIE:477588422}  Med Delivery   { PETR MED Delivery:062787879}    Wound Care Documentation and Therapy:

## 2020-02-25 NOTE — ED PROVIDER NOTES
hemotympanum, no epistaxis, no midface instability  NECK: Supple. Nonlabored, no midline spine w/ neck or back back tenderness to palpation  HEART/chest: RRR. No murmurs, left sided nipple ring, right-sided parasternal chest wall tenderness to palpation, approximately fourth rib, no flail chest or deformity, no paradoxical motion, posterior lateral right lower ribs tenderness to palpation, no ecchymosis or obvious evidence of trauma  lUNGS: Respirations nonlabored. Lungs are clear to auscultation bilaterally. ABDOMEN: Soft. Non-distended. Non-tender. No guarding or rebound. Normal bowel sounds. EXTREMITIES: No peripheral edema. Moves all extremities equally. All extremities neurovascularly intact. Right upper extremity exam, radial pulse 2+, tenderness to palpation over his scapular region, worse along the spinous scapula, no clavicular or AC area tenderness to palpation, pain with range of motion of the shoulder, but able to perform full flexion/extension/abduction/abduction/internal/external rotation but with some limitation due to pain, distal sensation intact in all digits, hand tendon exam intact, normal wrist and elbow range of motion, no ecchymosis  SKIN: Warm and dry. No acute rashes. NEUROLOGICAL: Alert and oriented. No gross facial drooping. Strength and sensation grossly intact, normal speech  PSYCHIATRIC: Normal mood and affect. RADIOLOGY  Xr Ribs Right Include Chest (min 3 Views)    Result Date: 2/25/2020  EXAMINATION: TWO XRAY VIEWS OF THE RIGHT SCAPULA; 3 XRAY VIEWS OF THE RIGHT RIBS WITH FRONTAL XRAY VIEW OF THE CHEST; 2 XRAY VIEWS OF THE RIGHT ELBOW 2/25/2020 1:04 am COMPARISON: None. HISTORY: ORDERING SYSTEM PROVIDED HISTORY: L shoulder pain, fall TECHNOLOGIST PROVIDED HISTORY: Reason for exam:->L shoulder pain, fall Reason for Exam: Fall (Right shoulder, head, neck. Unsure of LOC.  States he was walking down a ramp and feet came out from under him. ) Acuity: Acute Type of Exam: Initial; ORDERING SYSTEM PROVIDED HISTORY: left ant and post rib pain, fall TECHNOLOGIST PROVIDED HISTORY: Reason for exam:->left ant and post rib pain, fall Reason for Exam: Fall (Right shoulder, head, neck. Unsure of LOC. States he was walking down a ramp and feet came out from under him. ) Acuity: Acute Type of Exam: Initial; ORDERING SYSTEM PROVIDED HISTORY: L elbow pain TECHNOLOGIST PROVIDED HISTORY: Reason for exam:->L elbow pain Reason for Exam: Fall (Right shoulder, head, neck. Unsure of LOC. States he was walking down a ramp and feet came out from under him. ) Acuity: Acute Type of Exam: Initial FINDINGS: Right elbow: Alignment appears normal.  No acute fracture noted. There is mild spurring the lateral epicondyle. No obvious joint effusion. Subtle spurring is seen at the triceps tendon insertion on the olecranon Right scapula: Alignment appears normal.  No acute fracture or malalignment noted. Ribs and chest: Heart size is normal.  Mediastinal contours are normal.  Pulmonary vascularity is normal.  No focal lung consolidation is noted. No obvious pneumothorax seen There is subtle cortical irregularity of the right 8th rib. Calcified granuloma are seen within the spleen. No acute osseous abnormality right elbow No acute osseous abnormality right scapula. Questionable right 8th rib fracture. No obvious pneumothorax     Xr Scapula Right (complete)    Result Date: 2/25/2020  EXAMINATION: TWO XRAY VIEWS OF THE RIGHT SCAPULA; 3 XRAY VIEWS OF THE RIGHT RIBS WITH FRONTAL XRAY VIEW OF THE CHEST; 2 XRAY VIEWS OF THE RIGHT ELBOW 2/25/2020 1:04 am COMPARISON: None. HISTORY: ORDERING SYSTEM PROVIDED HISTORY: L shoulder pain, fall TECHNOLOGIST PROVIDED HISTORY: Reason for exam:->L shoulder pain, fall Reason for Exam: Fall (Right shoulder, head, neck. Unsure of LOC.  States he was walking down a ramp and feet came out from under him. ) Acuity: Acute Type of Exam: Initial; ORDERING SYSTEM PROVIDED HISTORY: left HISTORY: Reason for exam:->left ant and post rib pain, fall Reason for Exam: Fall (Right shoulder, head, neck. Unsure of LOC. States he was walking down a ramp and feet came out from under him. ) Acuity: Acute Type of Exam: Initial; ORDERING SYSTEM PROVIDED HISTORY: L elbow pain TECHNOLOGIST PROVIDED HISTORY: Reason for exam:->L elbow pain Reason for Exam: Fall (Right shoulder, head, neck. Unsure of LOC. States he was walking down a ramp and feet came out from under him. ) Acuity: Acute Type of Exam: Initial FINDINGS: Right elbow: Alignment appears normal.  No acute fracture noted. There is mild spurring the lateral epicondyle. No obvious joint effusion. Subtle spurring is seen at the triceps tendon insertion on the olecranon Right scapula: Alignment appears normal.  No acute fracture or malalignment noted. Ribs and chest: Heart size is normal.  Mediastinal contours are normal.  Pulmonary vascularity is normal.  No focal lung consolidation is noted. No obvious pneumothorax seen There is subtle cortical irregularity of the right 8th rib. Calcified granuloma are seen within the spleen. No acute osseous abnormality right elbow No acute osseous abnormality right scapula. Questionable right 8th rib fracture. No obvious pneumothorax     Ct Head Wo Contrast    Result Date: 2/25/2020  EXAMINATION: CT OF THE HEAD WITHOUT CONTRAST  2/25/2020 1:25 am TECHNIQUE: CT of the head was performed without the administration of intravenous contrast. Dose modulation, iterative reconstruction, and/or weight based adjustment of the mA/kV was utilized to reduce the radiation dose to as low as reasonably achievable. COMPARISON: None. HISTORY: ORDERING SYSTEM PROVIDED HISTORY: head injury, fall TECHNOLOGIST PROVIDED HISTORY: Has a \"code stroke\" or \"stroke alert\" been called? ->No Reason for exam:->head injury, fall Reason for Exam: Fall (Right shoulder, head, neck. Unsure of LOC.  States he was walking down a ramp and feet came out from under him. ) Acuity: Acute Type of Exam: Initial FINDINGS: BRAIN/VENTRICLES: There is no acute intracranial hemorrhage, mass effect or midline shift. No abnormal extra-axial fluid collection. The gray-white differentiation is maintained without evidence of an acute infarct. There is no evidence of hydrocephalus. ORBITS: The visualized portion of the orbits demonstrate no acute abnormality. SINUSES: The visualized paranasal sinuses and mastoid air cells demonstrate no acute abnormality. SOFT TISSUES/SKULL:  Focal soft tissue nodule is seen overlying the parietal bone on the right measuring 1.5 cm     No acute traumatic intracranial abnormality Focal soft tissue nodule in the scalp on the right. Correlate with clinical exam.  This could represent a small hematoma. Ct Cervical Spine Wo Contrast    Result Date: 2/25/2020  EXAMINATION: CT OF THE CERVICAL SPINE WITHOUT CONTRAST 2/25/2020 1:25 am TECHNIQUE: CT of the cervical spine was performed without the administration of intravenous contrast. Multiplanar reformatted images are provided for review. Dose modulation, iterative reconstruction, and/or weight based adjustment of the mA/kV was utilized to reduce the radiation dose to as low as reasonably achievable. COMPARISON: None. HISTORY: ORDERING SYSTEM PROVIDED HISTORY: neck pain, fall TECHNOLOGIST PROVIDED HISTORY: Reason for exam:->neck pain, fall Reason for Exam: Fall (Right shoulder, head, neck. Unsure of LOC. States he was walking down a ramp and feet came out from under him. ) Acuity: Acute Type of Exam: Initial FINDINGS: There is straightening of the cervical spine. No significant fluid in the mastoids Bony ring of C1 appears intact. Vertebral bodies appear intact. Prevertebral soft tissues appear normal. No vertebral body fracture noted. Multiple levels of disc space narrowing and disc space spur formation are seen.   Prominent posterior spurring is seen at multiple levels particularly at C3-C4, Sig: Take 1 tablet by mouth every 6 hours as needed for Pain for up to 2 days. Dispense:  8 tablet     Refill:  0          CLINICAL IMPRESSION  1. Closed head injury, initial encounter    2. Fall, initial encounter    3. Closed fracture of one rib of right side, initial encounter    4. Multiple contusions    5. Elevated blood pressure reading    6. Smoker    7. Marijuana user        Blood pressure (!) 174/99, pulse 113, temperature 98.3 °F (36.8 °C), temperature source Oral, resp. rate 15, height 6' 1\" (1.854 m), weight 280 lb (127 kg), SpO2 100 %. Marc Escalante was discharged to home in stable condition.                   Pollo Parada, DO  02/25/20 9318

## 2020-04-13 ENCOUNTER — APPOINTMENT (OUTPATIENT)
Dept: GENERAL RADIOLOGY | Age: 50
End: 2020-04-13
Payer: MEDICARE

## 2020-04-13 ENCOUNTER — HOSPITAL ENCOUNTER (EMERGENCY)
Age: 50
Discharge: HOME | End: 2020-04-13
Attending: EMERGENCY MEDICINE
Payer: MEDICARE

## 2020-04-13 VITALS
WEIGHT: 275 LBS | TEMPERATURE: 98.4 F | BODY MASS INDEX: 36.45 KG/M2 | OXYGEN SATURATION: 99 % | HEART RATE: 111 BPM | HEIGHT: 73 IN | SYSTOLIC BLOOD PRESSURE: 146 MMHG | RESPIRATION RATE: 15 BRPM | DIASTOLIC BLOOD PRESSURE: 80 MMHG

## 2020-04-13 LAB
GLUCOSE BLD-MCNC: 350 MG/DL (ref 70–99)
PERFORMED ON: ABNORMAL

## 2020-04-13 PROCEDURE — 73610 X-RAY EXAM OF ANKLE: CPT

## 2020-04-13 PROCEDURE — 6370000000 HC RX 637 (ALT 250 FOR IP): Performed by: EMERGENCY MEDICINE

## 2020-04-13 PROCEDURE — 99283 EMERGENCY DEPT VISIT LOW MDM: CPT

## 2020-04-13 RX ORDER — CLINDAMYCIN HYDROCHLORIDE 150 MG/1
300 CAPSULE ORAL ONCE
Status: COMPLETED | OUTPATIENT
Start: 2020-04-13 | End: 2020-04-13

## 2020-04-13 RX ORDER — CHLORHEXIDINE GLUCONATE 0.12 MG/ML
15 RINSE ORAL 2 TIMES DAILY
Qty: 420 ML | Refills: 0 | Status: SHIPPED | OUTPATIENT
Start: 2020-04-13 | End: 2020-04-27

## 2020-04-13 RX ORDER — CYCLOBENZAPRINE HCL 10 MG
10 TABLET ORAL NIGHTLY PRN
Qty: 15 TABLET | Refills: 0 | Status: SHIPPED | OUTPATIENT
Start: 2020-04-13 | End: 2020-04-28

## 2020-04-13 RX ORDER — NAPROXEN 250 MG/1
500 TABLET ORAL ONCE
Status: COMPLETED | OUTPATIENT
Start: 2020-04-13 | End: 2020-04-13

## 2020-04-13 RX ORDER — GLIPIZIDE 5 MG/1
5 TABLET ORAL DAILY
Qty: 30 TABLET | Refills: 0 | Status: SHIPPED | OUTPATIENT
Start: 2020-04-13 | End: 2022-04-28 | Stop reason: ALTCHOICE

## 2020-04-13 RX ORDER — CLINDAMYCIN HYDROCHLORIDE 300 MG/1
300 CAPSULE ORAL 4 TIMES DAILY
Qty: 28 CAPSULE | Refills: 0 | Status: SHIPPED | OUTPATIENT
Start: 2020-04-13 | End: 2020-04-20

## 2020-04-13 RX ORDER — NAPROXEN 500 MG/1
500 TABLET ORAL 2 TIMES DAILY PRN
Qty: 20 TABLET | Refills: 0 | Status: SHIPPED | OUTPATIENT
Start: 2020-04-13 | End: 2021-09-09 | Stop reason: ALTCHOICE

## 2020-04-13 RX ADMIN — CLINDAMYCIN HYDROCHLORIDE 300 MG: 150 CAPSULE ORAL at 21:16

## 2020-04-13 RX ADMIN — NAPROXEN 500 MG: 250 TABLET ORAL at 21:15

## 2020-04-13 ASSESSMENT — PAIN DESCRIPTION - PAIN TYPE: TYPE: ACUTE PAIN

## 2020-04-13 ASSESSMENT — PAIN DESCRIPTION - LOCATION: LOCATION: EAR;FACE

## 2020-04-13 ASSESSMENT — PAIN SCALES - GENERAL
PAINLEVEL_OUTOF10: 6
PAINLEVEL_OUTOF10: 6

## 2020-04-13 ASSESSMENT — PAIN DESCRIPTION - DESCRIPTORS: DESCRIPTORS: PENETRATING

## 2020-04-13 ASSESSMENT — PAIN DESCRIPTION - ORIENTATION: ORIENTATION: RIGHT;LEFT

## 2020-04-14 NOTE — ED PROVIDER NOTES
CHIEF COMPLAINT  Otalgia (thinks he has toothache as well to left side)      HISTORY OF PRESENT ILLNESS  Antoine Salgado is a 45 y.o. male presents to the ED with multiple complaints, he is currently admitting that he is slightly high from marijuana use, has rapidly changing complaints, states his most concern is related to the area in front of both of his ears, hurts opening/ closing his mouth, states when he fell when he was here last time in February it felt like he gurinder his jaw and has been having issues since then, no known fevers, no ear drainage, no congestion or cough, no current coronavirus exposure concerns or recent travel, he is also complaining of his right ankle pain, he sees a podiatrist but has not been there recently, was told he needs surgery but they are not doing elective surgery right now due to the pandemic, states he falls occasionally because the ankle gives out on him, was worried he could have done more damage, states he knows he has torn ligaments in the ankle that cause him chronic pain, he thinks he also has an abscessed tooth, left upper face area having pain, states he went and was seen for this before, it improved after clindamycin, but the past few days it is started getting worse, also noticed a bump in his mouth over that tooth, hurts to chew, states he also has bad teeth on the lower both sides. He does have diabetes as well, states he has not had his supplies recently, has been trying to get them to mail to him, but has not seen primary care in a while, states his doctor retired, no other complaints, modifying factors or associated symptoms. I have reviewed the following from the nursing documentation. Past Medical History:   Diagnosis Date    [facility]estive [facility] failure) ([facility]     Diabetes mellitus ([facility]     Hyperlipidemia     Hypertension     MRSA (methicillin resistant staph aureus) culture positive 7/12/019    + left leg bug bite.      Past Surgical History: VIEWS OF THE RIGHT ANKLE 4/13/2020 9:10 pm COMPARISON: Right ankle radiographs 10/17/2019. MRI right ankle 10/21/2019. HISTORY: ORDERING SYSTEM PROVIDED HISTORY: R ankle pain, prior surgery TECHNOLOGIST PROVIDED HISTORY: Reason for exam:->R ankle pain, prior surgery Reason for Exam: right lateral ankle pain s/p fall x 1 month ago, pt stated he was supposed to have surgery on ankle for ligament damage and later upper tib/fib palpable nodule prior to fall Acuity: Chronic Type of Exam: Ongoing FINDINGS: Mild tibiotalar degenerative changes. The subtalar joint is intact. Moderate retrocalcaneal enthesophyte. No fracture or dislocation. The soft tissues are unremarkable. 1. No acute osseous abnormality. 2. Mild tibiotalar degenerative changes. ED COURSE/MDM  Patient seen and evaluated. Old records reviewed. Labs and imaging reviewed and results discussed with patient.    68-year-old male with multiple issues today, primarily needs treatment for likely abscessed tooth, low suspicion for Yoni's angina, no evidence of abscess or concern for current endocarditis, started on clindamycin, encouraged dental follow-up, and ENT follow-up as he does have symptoms consistent with TMJ dysfunction, though it does not appear to be an obvious cancerous lesion, encouraged follow-up for the oral lesion as he did chew tobacco, given naproxen here, and naproxen with Flexeril for home, but explained there are other options for treatment but recommended conservative therapy at this time, he has poor dentition, and gum disease, given Peridex rinse, he states he has been taking his thousand milligrams twice daily of metformin bout, but this is obviously not controlling his blood sugars, he has not been eating well either, recommended watching his carb intake, and will start low-dose of glipizide, though explained that ideally I would not like him to be on a sulfonylurea long-term, but many other diabetes medications would

## 2020-04-14 NOTE — DISCHARGE INSTR - COC
Continuity of Care Form    Patient Name: Christos Stevens   :  1970  MRN:  [id number]    Admit date:  2020  Discharge date:  ***    Code Status Order: No Order   Advance Directives:     Admitting Physician:  No admitting provider for patient encounter. PCP: No primary care provider on file. Discharging Nurse: [facility] Unit/Room#:   Discharging Unit Phone Number: ***    Emergency Contact:   Extended Emergency Contact Information  Primary Emergency Contact: Isaiah Barragan  Home Phone: [phone]  Relation: Parent    Past Surgical History:  Past Surgical History:   Procedure Laterality Date    CARPAL TUNNEL RELEASE      CYST INCISION AND DRAINAGE      ROTATOR CUFF REPAIR Bilateral        Immunization History: There is no immunization history on file for this patient. Active Problems: There is no problem list on file for this patient.       Isolation/Infection:   Isolation          No Isolation        Patient Infection Status     Infection Onset Added Last Indicated Last Indicated By Review Planned Expiration Resolved Resolved By    [facility] 19 Wound Culture              Nurse Assessment:  Last Vital Signs: BP (!) 146/80   Pulse 111   Temp 98.4 °F (36.9 °C) (Oral)   Resp 15   Ht 6' 1\" (1.854 m)   Wt 275 lb (124.7 kg)   SpO2 99%   BMI 36.28 kg/m²     Last documented pain score (0-10 scale): Pain Level: 6  Last Weight:   Wt Readings from Last 1 Encounters:   20 275 lb (124.7 kg)     Mental Status:  {IP PT MENTAL STATUS:54455}    IV Access:  { PETR IV ACCESS:377578681}    Nursing Mobility/ADLs:  Walking   {P DME FNRU:622880002}  Transfer  {[facility] DME VTJQ:964044860}  Bathing  {[facility] DME YHTB:688118121}  Dressing  {[facility] DME IKWK:599923377}  Toileting  {[facility] DME BGSJ:959901423}  Feeding  {[facility] DME VCKX:599251648}  Med Admin  {[facility] DME KEHB:674965238}  Med Delivery   { PETR MED Delivery:101429418}    Wound Care Documentation and Therapy: Elimination:  Continence:   · Bowel: {YES / JH:92286}  · Bladder: {YES / GT:57586}  Urinary Catheter: {Urinary Catheter:054968275}   Colostomy/Ileostomy/Ileal Conduit: {YES / EC:51170}       Date of Last BM: ***  No intake or output data in the 24 hours ending 20  No intake/output data recorded.     Safety Concerns:     [facility] Safety Concerns:247948554}    Impairments/Disabilities:      [facility] Impairments/Disabilities:157095353}    Nutrition Therapy:  Current Nutrition Therapy:   [facility] Diet List:256229545}    Routes of Feeding: {CHP DME Other Feedings:584277517}  Liquids: {Slp liquid thickness:68533}  Daily Fluid Restriction: {CHP DME Yes amt example:272138978}  Last Modified Barium Swallow with Video (Video Swallowing Test): {Done Not Done TUXR:985466642}    Treatments at the Time of Hospital Discharge:   Respiratory Treatments: ***  Oxygen Therapy:  {Therapy; copd oxygen:32538}  Ventilator:    {[facility] Vent IXZE:903895904}    Rehab Therapies: {THERAPEUTIC INTERVENTION:7075128237}  Weight Bearing Status/Restrictions: [facility] Weight Bearin}  Other Medical Equipment (for information only, NOT a DME order):  {EQUIPMENT:836299071}  Other Treatments: ***    Patient's personal belongings (please select all that are sent with patient):  {[facility] DME Belongings:043889087}    RN SIGNATURE:  {Esignature:885314150}    CASE MANAGEMENT/SOCIAL WORK SECTION    Inpatient Status Date: ***    Readmission Risk Assessment Score:  Readmission Risk              Risk of Unplanned Readmission:        0           Discharging to Facility/ Agency   · Name:   · Address:  · Phone:  · Fax:    Dialysis Facility (if applicable)   · Name:  · Address:  · Dialysis Schedule:  · Phone:  · Fax:    / signature: {Esignature:118069184}    PHYSICIAN SECTION    Prognosis: {Prognosis:1149891831}    Condition at Discharge: [facility] Patient Condition:197540142}    Rehab Potential (if transferring to Rehab):

## 2021-06-02 ENCOUNTER — CLINICAL DOCUMENTATION (OUTPATIENT)
Dept: OTHER | Age: 51
End: 2021-06-02

## 2021-08-30 DIAGNOSIS — K40.30 INCARCERATED RIGHT INGUINAL HERNIA: ICD-10-CM

## 2021-09-07 DIAGNOSIS — Z01.818 PRE-OP TESTING: Primary | ICD-10-CM

## 2021-09-09 ENCOUNTER — TELEPHONE (OUTPATIENT)
Dept: SURGERY | Age: 51
End: 2021-09-09

## 2021-09-09 ENCOUNTER — ANESTHESIA EVENT (OUTPATIENT)
Dept: OPERATING ROOM | Age: 51
End: 2021-09-09
Payer: MEDICARE

## 2021-09-09 DIAGNOSIS — K40.90 NON-RECURRENT UNILATERAL INGUINAL HERNIA WITHOUT OBSTRUCTION OR GANGRENE: Primary | ICD-10-CM

## 2021-09-09 RX ORDER — OXYCODONE HYDROCHLORIDE 5 MG/1
5 TABLET ORAL EVERY 6 HOURS PRN
Qty: 20 TABLET | Refills: 0 | Status: SHIPPED | OUTPATIENT
Start: 2021-09-09 | End: 2021-09-10 | Stop reason: SDUPTHER

## 2021-09-09 NOTE — PROGRESS NOTES
PRE OP INSTRUCTION SHEET   1. Do not eat or drink anything after 12 midnight  prior to surgery. This includes no water, chewing gum or mints. 2. Take the following pills will a small sip of water (see MAR)                                        3. Aspirin, Ibuprofen, Advil, Naproxen, Vitamin E, fish oil and other Anti-inflammatory products should be stopped for 5 days before surgery or as directed by your physician. 4. Check with your Doctor regarding stopping Plavix, Coumadin, Lovenox, Fragmin or other blood thinners   5. Do not smoke, and do not drink any alcoholic beverages 24 hours prior to surgery. This includes NA Beer. 6. You may brush your teeth and gargle the morning of surgery. DO NOT SWALLOW WATER   7. You MUST make arrangements for a responsible adult to take you home after your surgery. You will not be allowed to leave alone or drive yourself home. It is strongly suggested someone stay with you the first 24 hrs. Your surgery will be cancelled if you do not have a ride home. 8. A parent/legal guardian must accompany a child scheduled for surgery and plan to stay at the hospital until the child is discharged. Please do not bring other children with you. 9. Please wear simple, loose fitting clothing to the hospital.  Ángel Solid not bring valuables (money, credit cards, checkbooks, etc.) Do not wear any makeup (including no eye makeup) or nail polish on your fingers or toes. 10. DO NOT wear any jewelry or piercings on day of surgery. All body piercing jewelry must be removed. 11. If you have dentures,glasses, or contacts they will be removed before going to the OR; we will provide you a container. 12. Please see your family doctor/and cardiologist for a history & physical and/or concerning medications. Bring any test results/reports from your physician's office. Have history and labs faxed to [phone].  Remember to bring Blood Bank bracelet on the day of surgery. 14. If you have a Living Will and Durable Power of  for Healthcare, please bring in a copy. 13. Notify your Surgeon if you develop any illness between now and surgery  time, cough, cold, fever, sore throat, nausea, vomiting, etc.  Please notify your surgeon if you experience dizziness, shortness of breath or blurred vision between now & the time of your surgery   16. DO NOT shave your operative site 96 hours prior to surgery. For face & neck surgery, men may use an electric razor 48 hours prior to surgery. 17. Shower with _x__Antibacterial soap (x_chlorhexidine for total joint  Pt's) shower two times before surgery.(the morning of and the night before. 18. To provide excellent care visitors will be limited to one in the room at any given time.   Please call pre admission testing if you any further questions [phone] or [phone]

## 2021-09-09 NOTE — PROGRESS NOTES
Preoperative Screening for Elective Surgery/Invasive Procedures While COVID-19 present in the community     Have you had any of the following symptoms?no  o Fever, chills  o Cough  o Shortness of breath  o Muscle aches/pain  o Diarrhea  o Abdominal pain, nausea, vomiting  o Loss or decrease in taste and / or smell   Risk of Exposure  o Have you recently been hospitalized for COVID-19 or flu-like illness, if so when?no  o Recently diagnosed with COVID-19, if so when?no  o Recently tested for COVID-19, if so when?yes 9/8/21 for surgery  o Have you been in close contact with a person or family member who currently has or recently had COVID-19? If yes, when and in what context?no  o Do you live with anybody who in the last 14 days has had fever, chills, shortness of breath, muscle aches, flu-like illness?no  o Do you have any close contacts or family members who are currently in the hospital for COVID-19 or flu-like illness? If yes, assess recent close contact with this person. no    Indicate if the patient has a positive screen by answering yes to one or more of the above questions. Patients who test positive or screen positive prior to surgery or on the day of surgery should be evaluated in conjunction with the surgeon/proceduralist/anesthesiologist to determine the urgency of the procedure.

## 2021-09-10 ENCOUNTER — TELEPHONE (OUTPATIENT)
Dept: SURGERY | Age: 51
End: 2021-09-10

## 2021-09-10 DIAGNOSIS — K40.90 NON-RECURRENT UNILATERAL INGUINAL HERNIA WITHOUT OBSTRUCTION OR GANGRENE: ICD-10-CM

## 2021-09-10 LAB — SARS-COV-2, NAA: NOT DETECTED

## 2021-09-10 RX ORDER — OXYCODONE HYDROCHLORIDE 5 MG/1
5 TABLET ORAL EVERY 6 HOURS PRN
Qty: 20 TABLET | Refills: 0 | Status: SHIPPED | OUTPATIENT
Start: 2021-09-10 | End: 2021-09-17

## 2021-09-10 NOTE — TELEPHONE ENCOUNTER
Patient called and states [facility] in [place] does not carry the pain medication that was ordered yesterday and they can not transfer it to another pharmacy. He is asking if you will send it to [address] [place] [address] in [place].

## 2021-09-13 ENCOUNTER — ANESTHESIA (OUTPATIENT)
Dept: OPERATING ROOM | Age: 51
End: 2021-09-13
Payer: MEDICARE

## 2021-09-13 ENCOUNTER — HOSPITAL ENCOUNTER (OUTPATIENT)
Age: 51
Setting detail: OUTPATIENT SURGERY
Discharge: HOME | End: 2021-09-13
Attending: SURGERY | Admitting: SURGERY
Payer: MEDICARE

## 2021-09-13 VITALS
RESPIRATION RATE: 20 BRPM | DIASTOLIC BLOOD PRESSURE: 62 MMHG | HEART RATE: 84 BPM | TEMPERATURE: 97.3 F | HEIGHT: 73 IN | SYSTOLIC BLOOD PRESSURE: 94 MMHG | WEIGHT: 230 LBS | OXYGEN SATURATION: 99 % | BODY MASS INDEX: 30.48 KG/M2

## 2021-09-13 VITALS — DIASTOLIC BLOOD PRESSURE: 52 MMHG | SYSTOLIC BLOOD PRESSURE: 78 MMHG | OXYGEN SATURATION: 100 %

## 2021-09-13 DIAGNOSIS — K40.30 INCARCERATED RIGHT INGUINAL HERNIA: Primary | ICD-10-CM

## 2021-09-13 LAB
ANION GAP SERPL CALCULATED.3IONS-SCNC: 9 MMOL/L (ref 3–16)
BUN BLDV-MCNC: 18 MG/DL (ref 7–20)
CALCIUM SERPL-MCNC: 8.9 MG/DL (ref 8.3–10.6)
CHLORIDE BLD-SCNC: 96 MMOL/L (ref 99–110)
CO2: 28 MMOL/L (ref 21–32)
CREAT SERPL-MCNC: 0.9 MG/DL (ref 0.9–1.3)
EKG ATRIAL RATE: 84 BPM
EKG DIAGNOSIS: NORMAL
EKG P AXIS: 58 DEGREES
EKG P-R INTERVAL: 166 MS
EKG Q-T INTERVAL: 402 MS
EKG QRS DURATION: 110 MS
EKG QTC CALCULATION (BAZETT): 475 MS
EKG R AXIS: -50 DEGREES
EKG T AXIS: 92 DEGREES
EKG VENTRICULAR RATE: 84 BPM
GFR AFRICAN AMERICAN: >60
GFR NON-AFRICAN AMERICAN: >60
GLUCOSE BLD-MCNC: 137 MG/DL (ref 70–99)
GLUCOSE BLD-MCNC: 140 MG/DL (ref 70–99)
PERFORMED ON: ABNORMAL
POTASSIUM SERPL-SCNC: 4.4 MMOL/L (ref 3.5–5.1)
SODIUM BLD-SCNC: 133 MMOL/L (ref 136–145)

## 2021-09-13 PROCEDURE — 2500000003 HC RX 250 WO HCPCS: Performed by: ANESTHESIOLOGY

## 2021-09-13 PROCEDURE — 6370000000 HC RX 637 (ALT 250 FOR IP): Performed by: ANESTHESIOLOGY

## 2021-09-13 PROCEDURE — 3700000000 HC ANESTHESIA ATTENDED CARE: Performed by: SURGERY

## 2021-09-13 PROCEDURE — 2580000003 HC RX 258: Performed by: ANESTHESIOLOGY

## 2021-09-13 PROCEDURE — 49507 PRP I/HERN INIT BLOCK >5 YR: CPT | Performed by: SURGERY

## 2021-09-13 PROCEDURE — 7100000001 HC PACU RECOVERY - ADDTL 15 MIN: Performed by: SURGERY

## 2021-09-13 PROCEDURE — 6360000002 HC RX W HCPCS: Performed by: NURSE ANESTHETIST, CERTIFIED REGISTERED

## 2021-09-13 PROCEDURE — 2709999900 HC NON-CHARGEABLE SUPPLY: Performed by: SURGERY

## 2021-09-13 PROCEDURE — 3600000012 HC SURGERY LEVEL 2 ADDTL 15MIN: Performed by: SURGERY

## 2021-09-13 PROCEDURE — 2580000003 HC RX 258: Performed by: SURGERY

## 2021-09-13 PROCEDURE — 3600000002 HC SURGERY LEVEL 2 BASE: Performed by: SURGERY

## 2021-09-13 PROCEDURE — 36415 COLL VENOUS BLD VENIPUNCTURE: CPT

## 2021-09-13 PROCEDURE — 93005 ELECTROCARDIOGRAM TRACING: CPT | Performed by: ANESTHESIOLOGY

## 2021-09-13 PROCEDURE — 6360000002 HC RX W HCPCS: Performed by: ANESTHESIOLOGY

## 2021-09-13 PROCEDURE — 6360000002 HC RX W HCPCS: Performed by: SURGERY

## 2021-09-13 PROCEDURE — 2500000003 HC RX 250 WO HCPCS: Performed by: NURSE ANESTHETIST, CERTIFIED REGISTERED

## 2021-09-13 PROCEDURE — 7100000000 HC PACU RECOVERY - FIRST 15 MIN: Performed by: SURGERY

## 2021-09-13 PROCEDURE — 7100000011 HC PHASE II RECOVERY - ADDTL 15 MIN: Performed by: SURGERY

## 2021-09-13 PROCEDURE — 2500000003 HC RX 250 WO HCPCS: Performed by: SURGERY

## 2021-09-13 PROCEDURE — C1781 MESH (IMPLANTABLE): HCPCS | Performed by: SURGERY

## 2021-09-13 PROCEDURE — 80048 BASIC METABOLIC PNL TOTAL CA: CPT

## 2021-09-13 PROCEDURE — 7100000010 HC PHASE II RECOVERY - FIRST 15 MIN: Performed by: SURGERY

## 2021-09-13 PROCEDURE — 3700000001 HC ADD 15 MINUTES (ANESTHESIA): Performed by: SURGERY

## 2021-09-13 PROCEDURE — 93010 ELECTROCARDIOGRAM REPORT: CPT | Performed by: INTERNAL MEDICINE

## 2021-09-13 DEVICE — MESH HERN W3XL6IN INGUINAL POLYPR MFIL RECTANG: Type: IMPLANTABLE DEVICE | Site: GROIN | Status: FUNCTIONAL

## 2021-09-13 RX ORDER — OXYCODONE HYDROCHLORIDE AND ACETAMINOPHEN 5; 325 MG/1; MG/1
1 TABLET ORAL PRN
Status: COMPLETED | OUTPATIENT
Start: 2021-09-13 | End: 2021-09-13

## 2021-09-13 RX ORDER — HYDROMORPHONE HCL 110MG/55ML
PATIENT CONTROLLED ANALGESIA SYRINGE INTRAVENOUS PRN
Status: DISCONTINUED | OUTPATIENT
Start: 2021-09-13 | End: 2021-09-13 | Stop reason: SDUPTHER

## 2021-09-13 RX ORDER — MAGNESIUM HYDROXIDE 1200 MG/15ML
LIQUID ORAL CONTINUOUS PRN
Status: COMPLETED | OUTPATIENT
Start: 2021-09-13 | End: 2021-09-13

## 2021-09-13 RX ORDER — PROPOFOL 10 MG/ML
INJECTION, EMULSION INTRAVENOUS CONTINUOUS PRN
Status: DISCONTINUED | OUTPATIENT
Start: 2021-09-13 | End: 2021-09-13 | Stop reason: SDUPTHER

## 2021-09-13 RX ORDER — LIDOCAINE HYDROCHLORIDE 10 MG/ML
1 INJECTION, SOLUTION EPIDURAL; INFILTRATION; INTRACAUDAL; PERINEURAL
Status: DISCONTINUED | OUTPATIENT
Start: 2021-09-13 | End: 2021-09-13 | Stop reason: HOSPADM

## 2021-09-13 RX ORDER — SODIUM CHLORIDE 0.9 % (FLUSH) 0.9 %
10 SYRINGE (ML) INJECTION PRN
Status: DISCONTINUED | OUTPATIENT
Start: 2021-09-13 | End: 2021-09-13 | Stop reason: HOSPADM

## 2021-09-13 RX ORDER — APREPITANT 40 MG/1
40 CAPSULE ORAL ONCE
Status: COMPLETED | OUTPATIENT
Start: 2021-09-13 | End: 2021-09-13

## 2021-09-13 RX ORDER — SODIUM CHLORIDE 0.9 % (FLUSH) 0.9 %
10 SYRINGE (ML) INJECTION EVERY 12 HOURS SCHEDULED
Status: DISCONTINUED | OUTPATIENT
Start: 2021-09-13 | End: 2021-09-13 | Stop reason: HOSPADM

## 2021-09-13 RX ORDER — DIPHENHYDRAMINE HYDROCHLORIDE 50 MG/ML
6.25 INJECTION INTRAMUSCULAR; INTRAVENOUS
Status: DISCONTINUED | OUTPATIENT
Start: 2021-09-13 | End: 2021-09-13 | Stop reason: HOSPADM

## 2021-09-13 RX ORDER — LIDOCAINE HYDROCHLORIDE 20 MG/ML
INJECTION, SOLUTION INFILTRATION; PERINEURAL PRN
Status: DISCONTINUED | OUTPATIENT
Start: 2021-09-13 | End: 2021-09-13 | Stop reason: SDUPTHER

## 2021-09-13 RX ORDER — HYDRALAZINE HYDROCHLORIDE 20 MG/ML
5 INJECTION INTRAMUSCULAR; INTRAVENOUS EVERY 30 MIN PRN
Status: DISCONTINUED | OUTPATIENT
Start: 2021-09-13 | End: 2021-09-13 | Stop reason: HOSPADM

## 2021-09-13 RX ORDER — SODIUM CHLORIDE, SODIUM LACTATE, POTASSIUM CHLORIDE, CALCIUM CHLORIDE 600; 310; 30; 20 MG/100ML; MG/100ML; MG/100ML; MG/100ML
INJECTION, SOLUTION INTRAVENOUS CONTINUOUS
Status: DISCONTINUED | OUTPATIENT
Start: 2021-09-13 | End: 2021-09-13 | Stop reason: HOSPADM

## 2021-09-13 RX ORDER — ONDANSETRON 2 MG/ML
4 INJECTION INTRAMUSCULAR; INTRAVENOUS EVERY 30 MIN PRN
Status: DISCONTINUED | OUTPATIENT
Start: 2021-09-13 | End: 2021-09-13 | Stop reason: HOSPADM

## 2021-09-13 RX ORDER — POTASSIUM CHLORIDE 1.5 G/1.77G
20 POWDER, FOR SOLUTION ORAL DAILY
COMMUNITY
End: 2022-04-28 | Stop reason: ALTCHOICE

## 2021-09-13 RX ORDER — ROCURONIUM BROMIDE 10 MG/ML
INJECTION, SOLUTION INTRAVENOUS PRN
Status: DISCONTINUED | OUTPATIENT
Start: 2021-09-13 | End: 2021-09-13 | Stop reason: SDUPTHER

## 2021-09-13 RX ORDER — FENTANYL CITRATE 50 UG/ML
INJECTION, SOLUTION INTRAMUSCULAR; INTRAVENOUS PRN
Status: DISCONTINUED | OUTPATIENT
Start: 2021-09-13 | End: 2021-09-13 | Stop reason: SDUPTHER

## 2021-09-13 RX ORDER — MEPERIDINE HYDROCHLORIDE 25 MG/ML
12.5 INJECTION INTRAMUSCULAR; INTRAVENOUS; SUBCUTANEOUS EVERY 5 MIN PRN
Status: DISCONTINUED | OUTPATIENT
Start: 2021-09-13 | End: 2021-09-13 | Stop reason: HOSPADM

## 2021-09-13 RX ORDER — LABETALOL HYDROCHLORIDE 5 MG/ML
5 INJECTION, SOLUTION INTRAVENOUS
Status: DISCONTINUED | OUTPATIENT
Start: 2021-09-13 | End: 2021-09-13 | Stop reason: HOSPADM

## 2021-09-13 RX ORDER — MIDAZOLAM HYDROCHLORIDE 1 MG/ML
INJECTION INTRAMUSCULAR; INTRAVENOUS PRN
Status: DISCONTINUED | OUTPATIENT
Start: 2021-09-13 | End: 2021-09-13 | Stop reason: SDUPTHER

## 2021-09-13 RX ORDER — DEXAMETHASONE SODIUM PHOSPHATE 4 MG/ML
INJECTION, SOLUTION INTRA-ARTICULAR; INTRALESIONAL; INTRAMUSCULAR; INTRAVENOUS; SOFT TISSUE PRN
Status: DISCONTINUED | OUTPATIENT
Start: 2021-09-13 | End: 2021-09-13 | Stop reason: SDUPTHER

## 2021-09-13 RX ORDER — OXYCODONE HYDROCHLORIDE 5 MG/1
5 TABLET ORAL EVERY 6 HOURS PRN
Qty: 28 TABLET | Refills: 0 | Status: SHIPPED | OUTPATIENT
Start: 2021-09-13 | End: 2021-09-20

## 2021-09-13 RX ORDER — OXYCODONE HYDROCHLORIDE AND ACETAMINOPHEN 5; 325 MG/1; MG/1
2 TABLET ORAL PRN
Status: COMPLETED | OUTPATIENT
Start: 2021-09-13 | End: 2021-09-13

## 2021-09-13 RX ORDER — PROPOFOL 10 MG/ML
INJECTION, EMULSION INTRAVENOUS PRN
Status: DISCONTINUED | OUTPATIENT
Start: 2021-09-13 | End: 2021-09-13 | Stop reason: SDUPTHER

## 2021-09-13 RX ORDER — ONDANSETRON 2 MG/ML
INJECTION INTRAMUSCULAR; INTRAVENOUS PRN
Status: DISCONTINUED | OUTPATIENT
Start: 2021-09-13 | End: 2021-09-13 | Stop reason: SDUPTHER

## 2021-09-13 RX ORDER — SODIUM CHLORIDE 9 MG/ML
25 INJECTION, SOLUTION INTRAVENOUS PRN
Status: DISCONTINUED | OUTPATIENT
Start: 2021-09-13 | End: 2021-09-13 | Stop reason: HOSPADM

## 2021-09-13 RX ADMIN — DEXAMETHASONE SODIUM PHOSPHATE 8 MG: 4 INJECTION, SOLUTION INTRAMUSCULAR; INTRAVENOUS at 08:02

## 2021-09-13 RX ADMIN — OXYCODONE HYDROCHLORIDE AND ACETAMINOPHEN 2 TABLET: 5; 325 TABLET ORAL at 09:27

## 2021-09-13 RX ADMIN — ONDANSETRON 4 MG: 2 INJECTION, SOLUTION INTRAMUSCULAR; INTRAVENOUS at 08:03

## 2021-09-13 RX ADMIN — FAMOTIDINE 20 MG: 10 INJECTION, SOLUTION INTRAVENOUS at 06:47

## 2021-09-13 RX ADMIN — APREPITANT 40 MG: 40 CAPSULE ORAL at 06:47

## 2021-09-13 RX ADMIN — PROPOFOL 120 MG: 10 INJECTION, EMULSION INTRAVENOUS at 07:47

## 2021-09-13 RX ADMIN — PHENYLEPHRINE HYDROCHLORIDE 100 MCG: 10 INJECTION INTRAVENOUS at 08:20

## 2021-09-13 RX ADMIN — SODIUM CHLORIDE, POTASSIUM CHLORIDE, SODIUM LACTATE AND CALCIUM CHLORIDE: 600; 310; 30; 20 INJECTION, SOLUTION INTRAVENOUS at 08:39

## 2021-09-13 RX ADMIN — PROPOFOL 100 MCG/KG/MIN: 10 INJECTION, EMULSION INTRAVENOUS at 07:38

## 2021-09-13 RX ADMIN — PHENYLEPHRINE HYDROCHLORIDE 100 MCG: 10 INJECTION INTRAVENOUS at 08:01

## 2021-09-13 RX ADMIN — ROCURONIUM BROMIDE 50 MG: 10 INJECTION, SOLUTION INTRAVENOUS at 07:47

## 2021-09-13 RX ADMIN — HYDROMORPHONE HYDROCHLORIDE 0.5 MG: 2 INJECTION, SOLUTION INTRAMUSCULAR; INTRAVENOUS; SUBCUTANEOUS at 08:43

## 2021-09-13 RX ADMIN — PHENYLEPHRINE HYDROCHLORIDE 50 MCG: 10 INJECTION INTRAVENOUS at 07:56

## 2021-09-13 RX ADMIN — PHENYLEPHRINE HYDROCHLORIDE 100 MCG: 10 INJECTION INTRAVENOUS at 08:08

## 2021-09-13 RX ADMIN — FENTANYL CITRATE 50 MCG: 50 INJECTION INTRAMUSCULAR; INTRAVENOUS at 07:35

## 2021-09-13 RX ADMIN — PHENYLEPHRINE HYDROCHLORIDE 50 MCG: 10 INJECTION INTRAVENOUS at 07:59

## 2021-09-13 RX ADMIN — PHENYLEPHRINE HYDROCHLORIDE 100 MCG: 10 INJECTION INTRAVENOUS at 08:26

## 2021-09-13 RX ADMIN — SODIUM CHLORIDE, POTASSIUM CHLORIDE, SODIUM LACTATE AND CALCIUM CHLORIDE: 600; 310; 30; 20 INJECTION, SOLUTION INTRAVENOUS at 06:46

## 2021-09-13 RX ADMIN — Medication 1500 MG: at 07:28

## 2021-09-13 RX ADMIN — SUGAMMADEX 300 MG: 100 INJECTION, SOLUTION INTRAVENOUS at 08:35

## 2021-09-13 RX ADMIN — PROPOFOL 80 MG: 10 INJECTION, EMULSION INTRAVENOUS at 07:37

## 2021-09-13 RX ADMIN — MIDAZOLAM 2 MG: 1 INJECTION INTRAMUSCULAR; INTRAVENOUS at 07:30

## 2021-09-13 RX ADMIN — LIDOCAINE HYDROCHLORIDE 60 MG: 20 INJECTION, SOLUTION INFILTRATION; PERINEURAL at 07:37

## 2021-09-13 ASSESSMENT — PULMONARY FUNCTION TESTS
PIF_VALUE: 0
PIF_VALUE: 23
PIF_VALUE: 3
PIF_VALUE: 1
PIF_VALUE: 19
PIF_VALUE: 1
PIF_VALUE: 1
PIF_VALUE: 21
PIF_VALUE: 17
PIF_VALUE: 23
PIF_VALUE: 19
PIF_VALUE: 1
PIF_VALUE: 1
PIF_VALUE: 21
PIF_VALUE: 0
PIF_VALUE: 21
PIF_VALUE: 20
PIF_VALUE: 0
PIF_VALUE: 21
PIF_VALUE: 24
PIF_VALUE: 22
PIF_VALUE: 20
PIF_VALUE: 1
PIF_VALUE: 17
PIF_VALUE: 20
PIF_VALUE: 0
PIF_VALUE: 1
PIF_VALUE: 20
PIF_VALUE: 0
PIF_VALUE: 20
PIF_VALUE: 1
PIF_VALUE: 1
PIF_VALUE: 19
PIF_VALUE: 6
PIF_VALUE: 18
PIF_VALUE: 21
PIF_VALUE: 17
PIF_VALUE: 23
PIF_VALUE: 20
PIF_VALUE: 0
PIF_VALUE: 20
PIF_VALUE: 20
PIF_VALUE: 17
PIF_VALUE: 20
PIF_VALUE: 8
PIF_VALUE: 1
PIF_VALUE: 20
PIF_VALUE: 20
PIF_VALUE: 17
PIF_VALUE: 20
PIF_VALUE: 27
PIF_VALUE: 20
PIF_VALUE: 29
PIF_VALUE: 20
PIF_VALUE: 0
PIF_VALUE: 23
PIF_VALUE: 20
PIF_VALUE: 18
PIF_VALUE: 21
PIF_VALUE: 0
PIF_VALUE: 0
PIF_VALUE: 19
PIF_VALUE: 1
PIF_VALUE: 3
PIF_VALUE: 1
PIF_VALUE: 17
PIF_VALUE: 1

## 2021-09-13 ASSESSMENT — PAIN SCALES - GENERAL: PAINLEVEL_OUTOF10: 8

## 2021-09-13 ASSESSMENT — PAIN - FUNCTIONAL ASSESSMENT: PAIN_FUNCTIONAL_ASSESSMENT: 0-10

## 2021-09-13 NOTE — ANESTHESIA POSTPROCEDURE EVALUATION
Department of Anesthesiology  Postprocedure Note    Patient: Alberto Lozada  MRN: [id number]  YOB: 1970  Date of evaluation: 9/13/2021  Time:  1:23 PM     Procedure Summary     Date: 09/13/21 Room / [facility]    Anesthesia Start: 0730 Anesthesia Stop: 0845    Procedure: REPAIR INCARCERATED RIGHT INGUINAL HERNIA (Right Groin) Diagnosis: (INCARCERATED RIGHT INGUINAL HERNIA)    Surgeons: Jose A Araujo MD Responsible Provider: Dhaval Martinez MD    Anesthesia Type: MAC ASA Status: 3          Anesthesia Type: MAC    Sadie Phase I: Sadie Score: 10    Sadie Phase II: Sadie Score: 10    Last vitals: Reviewed and per EMR flowsheets.        Anesthesia Post Evaluation    Comments: Postoperative Anesthesia Note    Name:    Alberto Lozada  MRN:      [id number]    Patient Vitals in the past 12 hrs:  09/13/21 0950, BP:94/62, Temp:97.3 °F (36.3 °C), Temp src:Infrared, Pulse:84, Resp:20, SpO2:99 %  09/13/21 0923, BP:92/60, Temp:97.1 °F (36.2 °C), Temp src:Infrared, Pulse:81, Resp:18, SpO2:98 %  09/13/21 0921, BP:90/62, Temp:97.3 °F (36.3 °C), Temp src:Infrared, Pulse:82, Resp:18, SpO2:100 %  09/13/21 0907, BP:92/61, Temp:97.2 °F (36.2 °C), Temp src:Infrared, Pulse:78, Resp:18, SpO2:98 %  09/13/21 0855, BP:(!) 89/54, Temp:97.4 °F (36.3 °C), Temp src:Infrared, Pulse:79, Resp:18, SpO2:97 %  09/13/21 0850, BP:93/61, Temp:97.2 °F (36.2 °C), Temp src:Infrared, Pulse:77, Resp:16, SpO2:99 %  09/13/21 0845, BP:95/72, Temp:97.3 °F (36.3 °C), Temp src:Infrared, Pulse:82, Resp:18, SpO2:92 %  09/13/21 0644, BP:97/63, Temp:97.2 °F (36.2 °C), Temp src:Infrared, Pulse:82, Resp:14, SpO2:97 %, Height:6' 1\" (1.854 m), Weight:230 lb (104.3 kg)     LABS:    CBC  Lab Results       Component                Value               Date/Time                  WBC                      13.1 (H)            07/12/2019 01:28 PM        HGB                      15.5                07/12/2019 01:28 PM        HCT                      44.9                07/12/2019 01:28 PM        PLT                      254                 07/12/2019 01:28 PM   RENAL  Lab Results       Component                Value               Date/Time                  NA                       133 (L)             09/13/2021 06:45 AM        K                        4.4                 09/13/2021 06:45 AM        K                        3.7                 07/12/2019 01:28 PM        CL                       96 (L)              09/13/2021 06:45 AM        CO2                      28                  09/13/2021 06:45 AM        BUN                      18                  09/13/2021 06:45 AM        CREATININE               0.9                 09/13/2021 06:45 AM        GLUCOSE                  140 (H)             09/13/2021 06:45 AM   COAGS  No results found for: PROTIME, INR, APTT    Intake & Output: In: 1000 (I.V.:1000)  Out: 10     Nausea & Vomiting:  No    Level of Consciousness:  Awake    Pain Assessment:  Adequate analgesia    Anesthesia Complications:  No apparent anesthetic complications    SUMMARY      Vital signs stable  OK to discharge from Stage I post anesthesia care.   Care transferred from Anesthesiology department on discharge from perioperative area

## 2021-09-13 NOTE — PROGRESS NOTES
.Patient pre-op admission complete see flow sheet. IV started. Safety checks complete. Call light and family at bedside.

## 2021-09-13 NOTE — ANESTHESIA PRE PROCEDURE
Department of Anesthesiology  Preprocedure Note       Name:  Randi Aguilar   Age:  45 y.o.  :  1970                                          MRN:  [id number]         Date:  2021      Surgeon: Cata Solitario):  Lisa Bush MD    Procedure: Procedure(s):  REPAIR INCARCERATED RIGHT INGUINAL HERNIA    Medications prior to admission:   Prior to Admission medications    Medication Sig Start Date End Date Taking? Authorizing Provider   sacubitril-valsartan (ENTRESTO) 24-26 MG per tablet Take 1 tablet by mouth 2 times daily   Yes Historical Provider, MD   potassium chloride (KLOR-CON) 20 MEQ packet Take 20 mEq by mouth daily   Yes Historical Provider, MD   acetaminophen (TYLENOL) 500 MG tablet Take 1 tablet by mouth 4 times daily as needed for Pain 20  Yes Florinda Alvarenga PA-C   Elastic Bandages & Supports ([address] 1 each by Does not apply route daily as needed (swelling and pain) 10/17/19  Yes Florinda Alvarenga PA-C   furosemide (LASIX) 40 MG tablet Take 40 mg by mouth 2 times daily  19  Yes Historical Provider, MD   atorvastatin (LIPITOR) 40 MG tablet  19  Yes Historical Provider, MD   carvedilol (COREG) 6.25 MG tablet Take 6.25 mg by mouth 2 times daily (with meals)  19  Yes Historical Provider, MD   oxyCODONE (ROXICODONE) 5 MG immediate release tablet Take 1 tablet by mouth every 6 hours as needed for Pain for up to 7 days. Intended supply: 7 days.  Take lowest dose possible to manage pain 9/10/21 9/17/21  Lisa Bush MD   glipiZIDE (GLUCOTROL) 5 MG tablet Take 1 tablet by mouth daily 20  Lashae Anguiano DO   lidocaine viscous hcl (XYLOCAINE) 2 % SOLN solution Take 15 mLs by mouth as needed for Irritation 20   Florinda Alvarenga PA-C   metFORMIN (GLUCOPHAGE) 1000 MG tablet Take 1,000 mg by mouth daily (with breakfast)  19   Historical Provider, MD   losartan (COZAAR) 50 MG tablet Take 50 mg by mouth daily  19 Historical Provider, MD   spironolactone (ALDACTONE) 25 MG tablet Take 25 mg by mouth daily  6/17/19   Historical Provider, MD   RA ALCOHOL SWABS 70 % PADS  4/21/19   Historical Provider, MD   TRUE METRIX BLOOD GLUCOSE TEST strip  6/14/19   Historical Provider, MD   gabapentin (NEURONTIN) 600 MG tablet 1,200 mg 3 times daily.   6/21/19   Historical Provider, MD   TRUEPLUS LANCETS 33G [address] [facility]  4/21/19   Historical Provider, MD   albuterol sulfate  (90 Base) MCG/ACT inhaler  3/26/19   Historical Provider, MD       Current medications:    Current Facility-Administered Medications   Medication Dose Route Frequency Provider Last Rate Last Admin    lactated ringers infusion   IntraVENous Continuous Sailaja Paige MD        sodium chloride flush 0.9 % injection 10 mL  10 mL IntraVENous 2 times per day Sailaja Paige MD        sodium chloride flush 0.9 % injection 10 mL  10 mL IntraVENous PRN Sailaja Paige MD        0.9 % sodium chloride infusion  25 mL IntraVENous PRN Sailaja Paige MD        lidocaine PF 1 % injection 1 mL  1 mL IntraDERmal Once PRN Sailaja Paige MD        lactated ringers infusion   IntraVENous Continuous Jere Thakkar MD 50 mL/hr at 09/13/21 0646 New Bag at 09/13/21 0646    lidocaine 1 % (PF) injection 0.1 mL  0.1 mL IntraDERmal Once PRN Jere Thakkar MD        meperidine (DEMEROL) injection 12.5 mg  12.5 mg IntraVENous Q5 Min PRN Darlena Bloch, MD        HYDROmorphone (DILAUDID) injection 0.5 mg  0.5 mg IntraVENous Q10 Min PRN Darlena Bloch, MD        HYDROmorphone (DILAUDID) injection 0.5 mg  0.5 mg IntraVENous Q5 Min PRN Darlena Bloch, MD        oxyCODONE-acetaminophen (PERCOCET) 5-325 MG per tablet 1 tablet  1 tablet Oral PRN Darlena Bloch, MD        Or    oxyCODONE-acetaminophen (PERCOCET) 5-325 MG per tablet 2 tablet  2 tablet Oral PRN Darlena Bloch, MD        [facility]etron [facility]) injection 4 mg  4 mg IntraVENous Q30 Min PRN Debarah Runner, MD        diphenhydrAMINE (BENADRYL) injection 6.25 mg  6.25 mg IntraVENous Once PRN Debarah Runner, MD        labetalol (NORMODYNE;TRANDATE) injection 5 mg  5 mg IntraVENous Q15 Min PRN Debarah Runner, MD        hydrALAZINE (APRESOLINE) injection 5 mg  5 mg IntraVENous Q30 Min PRN Debarah Runner, MD           Allergies: Allergies   Allergen Reactions    Toradol [Ketorolac Tromethamine] Anaphylaxis    Bactrim [Sulfamethoxazole-Trimethoprim] Swelling     Tongue swelling      Keflex [Cephalexin]     Ketorolac Other (See Comments)     Drowsy       Problem List:    Patient Active Problem List   Diagnosis Code    Incarcerated right inguinal hernia K40.30       Past Medical History:        Diagnosis Date    [facility]     Diabetes mellitus [facility]     Hyperlipidemia     Hypertension     MRSA (methicillin resistant staph aureus) culture positive 7/12/019    + left leg bug bite. Past Surgical History:        Procedure Laterality Date    CARPAL TUNNEL RELEASE      CYST INCISION AND DRAINAGE      ROTATOR CUFF REPAIR Bilateral        Social History:    Social History     Tobacco Use    Smoking status: Never Smoker    Smokeless tobacco: Current User     Types: Chew   Substance Use Topics    Alcohol use:  No                                Ready to quit: Not Answered  Counseling given: Not Answered      Vital Signs (Current):   Vitals:    09/09/21 1318 09/13/21 0644   BP:  97/63   Pulse:  82   Resp:  14   Temp:  97.2 °F (36.2 °C)   TempSrc:  Infrared   SpO2:  97%   Weight: 230 lb (104.3 kg) 230 lb (104.3 kg)   Height: 6' 1\" (1.854 m) 6' 1\" (1.854 m)                                              BP Readings from Last 3 Encounters:   09/13/21 97/63   04/13/20 (!) 146/80   02/24/20 (!) 174/99       NPO Status: Time of last liquid consumption: 0450                        Time of last solid consumption: 2100                        Date of last liquid consumption: 09/13/21 (sip water AM meds)                        Date of last solid food consumption: 09/12/21    BMI:   Wt Readings from Last 3 Encounters:   09/13/21 230 lb (104.3 kg)   04/13/20 275 lb (124.7 kg)   02/24/20 280 lb (127 kg)     Body mass index is 30.34 kg/m². CBC:   Lab Results   Component Value Date    WBC 13.1 07/12/2019    RBC 4.85 07/12/2019    HGB 15.5 07/12/2019    HCT 44.9 07/12/2019    MCV 92.6 07/12/2019    RDW 14.4 07/12/2019     07/12/2019       CMP:   Lab Results   Component Value Date     07/12/2019    K 3.7 07/12/2019    CL 95 07/12/2019    CO2 22 07/12/2019    BUN 19 07/12/2019    CREATININE 1.0 07/12/2019    GFRAA >60 07/12/2019    AGRATIO 1.3 07/12/2019    LABGLOM >60 07/12/2019    GLUCOSE 248 07/12/2019    PROT 7.5 07/12/2019    CALCIUM 9.8 07/12/2019    BILITOT 0.4 07/12/2019    ALKPHOS 80 07/12/2019    AST 19 07/12/2019    ALT 27 07/12/2019       POC Tests:   Recent Labs     09/13/21  0645   POCGLU 137*       Coags: No results found for: PROTIME, INR, APTT    HCG (If Applicable): No results found for: PREGTESTUR, PREGSERUM, HCG, HCGQUANT     ABGs: No results found for: PHART, PO2ART, XOF6DRQ, LAO7SAW, BEART, O4THNANG     Type & Screen (If Applicable):  No results found for: LABABO, LABRH    Drug/Infectious Status (If Applicable):  No results found for: HIV, HEPCAB    COVID-19 Screening (If Applicable):   Lab Results   Component Value Date    COVID19 Not Detected 09/08/2021           Anesthesia Evaluation  Patient summary reviewed and Nursing notes reviewed no history of anesthetic complications:   Airway: Mallampati: II     Neck ROM: full   Dental:          Pulmonary:                              Cardiovascular:    (+) hypertension:, CHF:,                   Neuro/Psych:               GI/Hepatic/Renal:             Endo/Other:    (+) Diabetes, . Abdominal:             Vascular:           Other Findings:             Anesthesia Plan      MAC     ASA 3       Induction: intravenous. Anesthetic plan and risks discussed with patient. Plan discussed with CRNA.                   Stef Rizvi MD   9/13/2021

## 2021-09-13 NOTE — PROGRESS NOTES
Discharge instructions given to sister susan. Susan verbalized understanding. Patient dressing at this time. No signs or symptoms of distress noted. Walk in

## 2021-09-17 ENCOUNTER — TELEPHONE (OUTPATIENT)
Dept: SURGERY | Age: 51
End: 2021-09-17

## 2021-09-17 NOTE — TELEPHONE ENCOUNTER
Patient called and he asked how to remove his bandages. Then he states he is in so much pain that he can not get comfortable at all. He states \" I chewed 2 Percocets and it's not touching it\". He states he has a knot or lump at his belt-line and wants to \" know what it is? \". I asked him to describe the knot and he states \" I don't know, just a knot, and it hurts really bad. \"  He states the pain from surgery is so bad, he can't do anything, even laying down is painful. He states the pain is taking his breath away. He states he have someone take him to ER for evaluation, either [facility] or [facility].

## 2021-09-19 NOTE — OP NOTE
[address]                 [address]                                OPERATIVE REPORT    PATIENT NAME: Mario Pringle                   :        1970  MED REC NO:   [id number]                          ROOM:  ACCOUNT NO:   [de-identified]                           ADMIT DATE: 2021  PROVIDER:     Phoebe Reyes MD    DATE OF PROCEDURE:  2021    OPERATION PERFORMED:  Repair of incarcerated right inguinal hernia with  implantation of mesh. PREOPERATIVE DIAGNOSIS:  Incarcerated right inguinal hernia. POSTOPERATIVE DIAGNOSES:  1. Incarcerated right inguinal hernia. 2.  Ascites. SURGEON:  Phoebe Reyes MD    ANESTHESIA:  General.    COMPLICATIONS:  None. ESTIMATED BLOOD LOSS:  Less than 50 mL. INDICATIONS FOR OPERATION:  A 69-year-old male with an incarcerated  right inguinal hernia. I recommended operative repair. The patient was  quite symptomatic. He understood the risks and benefits and wanted to  proceed. DESCRIPTION OF OPERATION:  The patient was brought to the operating  room. General anesthesia was induced. He was prepped and draped in the  usual surgical sterile fashion. Local anesthetic was infiltrated. An  oblique right groin incision was made with a knife. Dissection was  carried down. The external abdominal oblique aponeurosis was incised  along the course of its fibers. The incision was extended medially to  the external ring as well as laterally. Inguinal contents were  encircled. There was an indirect inguinal hernia sac that was   from the cord and vascular structures. At this point, I was able to  reduce herniated contents. There was fluid in the sac. I opened the  sac. I then proceeded to evacuate about 2 liters of ascites. A high  ligation of the sac was then done with a 2-0 silk stitch.   Interrupted 0  Ethibond sutures were used to bring the transversalis fascia down to the  shelving edge of the inguinal ligament. This reduced the direct  component of the hernia and facilitated placement of a piece of mesh. Piece of mesh was cut with a keyhole to allow the cord and vascular  structures to come through the mesh. I started at the pubic tubercle  and secured the mesh to the shelving edge of the inguinal ligament in a  running fashion. A couple of sutures were placed medially to secure the  mesh to the transversalis fascia. The wings of the mesh were brought  together and secured to the shelving edge of the inguinal ligament. The  appropriate amount of laxity was created as the cord and vascular  structures came through the mesh. Hemostasis was obtained. The  external abdominal oblique aponeurosis was reapproximated with running 0  Vicryl suture. Local anesthetic was infiltrated. 3-0 Vicryl was used  to reapproximate subcutaneous tissues. 4-0 Vicryl was used to  reapproximate the skin. Benzoin and Steri-Strip dressing were placed. DISPOSITION:  The patient tolerated the procedure without any acute  complication.         Steven Pelletier MD    D: 09/19/2021 12:34:49       T: 09/19/2021 12:38:16     [email or web address]  Job#: [id number]     Doc#: [id number]    CC:  Dr. Danielle Zimmer

## 2021-09-20 ENCOUNTER — TELEPHONE (OUTPATIENT)
Dept: SURGERY | Age: 51
End: 2021-09-20

## 2021-09-20 DIAGNOSIS — K40.90 NON-RECURRENT UNILATERAL INGUINAL HERNIA WITHOUT OBSTRUCTION OR GANGRENE: Primary | ICD-10-CM

## 2021-09-20 RX ORDER — OXYCODONE HYDROCHLORIDE 5 MG/1
5 TABLET ORAL EVERY 6 HOURS PRN
Qty: 20 TABLET | Refills: 0 | Status: SHIPPED | OUTPATIENT
Start: 2021-09-20 | End: 2022-04-25 | Stop reason: SDUPTHER

## 2021-09-27 ENCOUNTER — TELEPHONE (OUTPATIENT)
Dept: SURGERY | Age: 51
End: 2021-09-27

## 2021-09-27 NOTE — TELEPHONE ENCOUNTER
Patient would like a refill on oxycodone and phenergan. Pt uses [facility] in [place] for prescriptions. Info is in chart.

## 2021-09-28 NOTE — TELEPHONE ENCOUNTER
I called and spoke to pt- advised to take Advil rotate with Tylenol pt states he has tried to take aspirin and Tylenol. I advised pt to try this, he stated he has been smoking \"mass amounts of his medical marijuana, and gets no relief. No other symptoms other than discomfort, I advised trying this, steri strips will come off by themselves. Call back with any other concerns, reassured pt that this takes time, and no heavy lifting.

## 2022-03-15 ENCOUNTER — TELEPHONE (OUTPATIENT)
Dept: SURGERY | Age: 52
End: 2022-03-15

## 2022-04-25 ENCOUNTER — TELEPHONE (OUTPATIENT)
Dept: SURGERY | Age: 52
End: 2022-04-25

## 2022-04-25 DIAGNOSIS — K40.90 NON-RECURRENT UNILATERAL INGUINAL HERNIA WITHOUT OBSTRUCTION OR GANGRENE: ICD-10-CM

## 2022-04-25 PROBLEM — K43.9 VENTRAL HERNIA: Status: ACTIVE | Noted: 2022-04-25

## 2022-04-25 RX ORDER — OXYCODONE HYDROCHLORIDE 5 MG/1
5 TABLET ORAL EVERY 6 HOURS PRN
Qty: 20 TABLET | Refills: 0 | Status: ON HOLD | OUTPATIENT
Start: 2022-04-25 | End: 2022-05-02 | Stop reason: SDUPTHER

## 2022-04-25 NOTE — TELEPHONE ENCOUNTER
Patient called today and scheduled open ventral hernia repair here at [facility] on 5/2/2022. He states he does not want to go to [facility] for surgery. He states he is in a lot of pain, and is asking if he can get a prescription for pain medication be sent to [facility] in [facility]?

## 2022-04-26 ENCOUNTER — ANESTHESIA EVENT (OUTPATIENT)
Dept: OPERATING ROOM | Age: 52
End: 2022-04-26
Payer: MEDICARE

## 2022-04-28 NOTE — PROGRESS NOTES
PRE OP INSTRUCTION SHEET   1. Do not eat or drink anything after 12 midnight  prior to surgery. This includes no water, chewing gum or mints. 2. Take the following pills will a small sip of water (see MAR)                                        3. Aspirin, Ibuprofen, Advil, Naproxen, Vitamin E, fish oil and other Anti-inflammatory products should be stopped for 5 days before surgery or as directed by your physician. 4. Check with your Doctor regarding stopping Plavix, Coumadin, Lovenox, Fragmin or other blood thinners   5. Do not smoke, and do not drink any alcoholic beverages 24 hours prior to surgery. This includes NA Beer. 6. You may brush your teeth and gargle the morning of surgery. DO NOT SWALLOW WATER   7. You MUST make arrangements for a responsible adult to take you home after your surgery. You will not be allowed to leave alone or drive yourself home. It is strongly suggested someone stay with you the first 24 hrs. Your surgery will be cancelled if you do not have a ride home. 8. A parent/legal guardian must accompany a child scheduled for surgery and plan to stay at the hospital until the child is discharged. Please do not bring other children with you. 9. Please wear simple, loose fitting clothing to the hospital.  Berenice Allred not bring valuables (money, credit cards, checkbooks, etc.) Do not wear any makeup (including no eye makeup) or nail polish on your fingers or toes. 10. DO NOT wear any jewelry or piercings on day of surgery. All body piercing jewelry must be removed. 11. If you have dentures,glasses, or contacts they will be removed before going to the OR; we will provide you a container. 12. Please see your family doctor/and cardiologist for a history & physical and/or concerning medications. Bring any test results/reports from your physician's office. Have history and labs faxed to [phone].  Remember to bring Blood Bank bracelet on the day of surgery. 14. If you have a Living Will and Durable Power of  for Healthcare, please bring in a copy. 13. Notify your Surgeon if you develop any illness between now and surgery  time, cough, cold, fever, sore throat, nausea, vomiting, etc.  Please notify your surgeon if you experience dizziness, shortness of breath or blurred vision between now & the time of your surgery   16. DO NOT shave your operative site 96 hours prior to surgery. For face & neck surgery, men may use an electric razor 48 hours prior to surgery. 17. Shower with _x__Antibacterial soap (x_chlorhexidine for total joint  Pt's) shower two times before surgery.(the morning of and the night before. 18. To provide excellent care visitors will be limited to one in the room at any given time.   Please call pre admission testing if you any further questions [phone]

## 2022-04-29 NOTE — PROGRESS NOTES
Spoke with patient aware of 0600 arrival time on 05/02/2022 NPO after midnight the night prior to surgery may take AM medications with sip of water sister Rain Vega will be  and caregiver after surgery. Collette Lane RN

## 2022-05-02 ENCOUNTER — HOSPITAL ENCOUNTER (OUTPATIENT)
Age: 52
Setting detail: OUTPATIENT SURGERY
Discharge: HOME | End: 2022-05-02
Attending: SURGERY | Admitting: SURGERY
Payer: MEDICARE

## 2022-05-02 ENCOUNTER — ANESTHESIA (OUTPATIENT)
Dept: OPERATING ROOM | Age: 52
End: 2022-05-02
Payer: MEDICARE

## 2022-05-02 VITALS
OXYGEN SATURATION: 95 % | HEART RATE: 57 BPM | DIASTOLIC BLOOD PRESSURE: 74 MMHG | TEMPERATURE: 98.2 F | RESPIRATION RATE: 16 BRPM | HEIGHT: 73 IN | SYSTOLIC BLOOD PRESSURE: 115 MMHG | BODY MASS INDEX: 27.17 KG/M2 | WEIGHT: 205 LBS

## 2022-05-02 VITALS
OXYGEN SATURATION: 93 % | DIASTOLIC BLOOD PRESSURE: 56 MMHG | RESPIRATION RATE: 16 BRPM | SYSTOLIC BLOOD PRESSURE: 110 MMHG

## 2022-05-02 DIAGNOSIS — K40.90 NON-RECURRENT UNILATERAL INGUINAL HERNIA WITHOUT OBSTRUCTION OR GANGRENE: ICD-10-CM

## 2022-05-02 PROBLEM — K43.6 INCARCERATED VENTRAL HERNIA: Status: ACTIVE | Noted: 2022-04-25

## 2022-05-02 LAB
ANION GAP SERPL CALCULATED.3IONS-SCNC: 9 MMOL/L (ref 3–16)
BUN BLDV-MCNC: 12 MG/DL (ref 7–20)
CALCIUM SERPL-MCNC: 9.2 MG/DL (ref 8.3–10.6)
CHLORIDE BLD-SCNC: 101 MMOL/L (ref 99–110)
CO2: 26 MMOL/L (ref 21–32)
CREAT SERPL-MCNC: 0.7 MG/DL (ref 0.9–1.3)
EKG ATRIAL RATE: 67 BPM
EKG DIAGNOSIS: NORMAL
EKG P AXIS: 62 DEGREES
EKG P-R INTERVAL: 164 MS
EKG Q-T INTERVAL: 414 MS
EKG QRS DURATION: 110 MS
EKG QTC CALCULATION (BAZETT): 437 MS
EKG R AXIS: -1 DEGREES
EKG T AXIS: 69 DEGREES
EKG VENTRICULAR RATE: 67 BPM
GFR AFRICAN AMERICAN: >60
GFR NON-AFRICAN AMERICAN: >60
GLUCOSE BLD-MCNC: 191 MG/DL (ref 70–99)
GLUCOSE BLD-MCNC: 201 MG/DL (ref 70–99)
GLUCOSE BLD-MCNC: 208 MG/DL (ref 70–99)
HCT VFR BLD CALC: 44.1 % (ref 40.5–52.5)
HEMOGLOBIN: 15.4 G/DL (ref 13.5–17.5)
MCH RBC QN AUTO: 31.7 PG (ref 26–34)
MCHC RBC AUTO-ENTMCNC: 34.9 G/DL (ref 31–36)
MCV RBC AUTO: 90.9 FL (ref 80–100)
PDW BLD-RTO: 14.9 % (ref 12.4–15.4)
PERFORMED ON: ABNORMAL
PERFORMED ON: ABNORMAL
PLATELET # BLD: 170 K/UL (ref 135–450)
PMV BLD AUTO: 8.9 FL (ref 5–10.5)
POTASSIUM REFLEX MAGNESIUM: 4.2 MMOL/L (ref 3.5–5.1)
RBC # BLD: 4.85 M/UL (ref 4.2–5.9)
SODIUM BLD-SCNC: 136 MMOL/L (ref 136–145)
WBC # BLD: 8.4 K/UL (ref 4–11)

## 2022-05-02 PROCEDURE — 2500000003 HC RX 250 WO HCPCS: Performed by: SURGERY

## 2022-05-02 PROCEDURE — 36415 COLL VENOUS BLD VENIPUNCTURE: CPT

## 2022-05-02 PROCEDURE — 6360000002 HC RX W HCPCS: Performed by: SURGERY

## 2022-05-02 PROCEDURE — 3600000012 HC SURGERY LEVEL 2 ADDTL 15MIN: Performed by: SURGERY

## 2022-05-02 PROCEDURE — 2580000003 HC RX 258: Performed by: NURSE ANESTHETIST, CERTIFIED REGISTERED

## 2022-05-02 PROCEDURE — 2500000003 HC RX 250 WO HCPCS: Performed by: ANESTHESIOLOGY

## 2022-05-02 PROCEDURE — 85027 COMPLETE CBC AUTOMATED: CPT

## 2022-05-02 PROCEDURE — 6360000002 HC RX W HCPCS: Performed by: NURSE ANESTHETIST, CERTIFIED REGISTERED

## 2022-05-02 PROCEDURE — 7100000011 HC PHASE II RECOVERY - ADDTL 15 MIN: Performed by: SURGERY

## 2022-05-02 PROCEDURE — 80048 BASIC METABOLIC PNL TOTAL CA: CPT

## 2022-05-02 PROCEDURE — 3700000001 HC ADD 15 MINUTES (ANESTHESIA): Performed by: SURGERY

## 2022-05-02 PROCEDURE — 7100000010 HC PHASE II RECOVERY - FIRST 15 MIN: Performed by: SURGERY

## 2022-05-02 PROCEDURE — 2580000003 HC RX 258: Performed by: ANESTHESIOLOGY

## 2022-05-02 PROCEDURE — 3700000000 HC ANESTHESIA ATTENDED CARE: Performed by: SURGERY

## 2022-05-02 PROCEDURE — 3600000002 HC SURGERY LEVEL 2 BASE: Performed by: SURGERY

## 2022-05-02 PROCEDURE — 2709999900 HC NON-CHARGEABLE SUPPLY: Performed by: SURGERY

## 2022-05-02 PROCEDURE — 93005 ELECTROCARDIOGRAM TRACING: CPT | Performed by: ANESTHESIOLOGY

## 2022-05-02 PROCEDURE — 49561 PR REPAIR INCISIONAL HERNIA,STRANG: CPT | Performed by: SURGERY

## 2022-05-02 RX ORDER — SODIUM CHLORIDE 9 MG/ML
25 INJECTION, SOLUTION INTRAVENOUS PRN
Status: DISCONTINUED | OUTPATIENT
Start: 2022-05-02 | End: 2022-05-02 | Stop reason: HOSPADM

## 2022-05-02 RX ORDER — SODIUM CHLORIDE 0.9 % (FLUSH) 0.9 %
5-40 SYRINGE (ML) INJECTION PRN
Status: DISCONTINUED | OUTPATIENT
Start: 2022-05-02 | End: 2022-05-02 | Stop reason: HOSPADM

## 2022-05-02 RX ORDER — FAMOTIDINE 10 MG/ML
20 INJECTION, SOLUTION INTRAVENOUS ONCE
Status: COMPLETED | OUTPATIENT
Start: 2022-05-02 | End: 2022-05-02

## 2022-05-02 RX ORDER — SODIUM CHLORIDE, SODIUM LACTATE, POTASSIUM CHLORIDE, CALCIUM CHLORIDE 600; 310; 30; 20 MG/100ML; MG/100ML; MG/100ML; MG/100ML
INJECTION, SOLUTION INTRAVENOUS CONTINUOUS
Status: DISCONTINUED | OUTPATIENT
Start: 2022-05-02 | End: 2022-05-02 | Stop reason: HOSPADM

## 2022-05-02 RX ORDER — FENTANYL CITRATE 50 UG/ML
INJECTION, SOLUTION INTRAMUSCULAR; INTRAVENOUS PRN
Status: DISCONTINUED | OUTPATIENT
Start: 2022-05-02 | End: 2022-05-02 | Stop reason: SDUPTHER

## 2022-05-02 RX ORDER — SODIUM CHLORIDE, SODIUM LACTATE, POTASSIUM CHLORIDE, CALCIUM CHLORIDE 600; 310; 30; 20 MG/100ML; MG/100ML; MG/100ML; MG/100ML
INJECTION, SOLUTION INTRAVENOUS CONTINUOUS PRN
Status: DISCONTINUED | OUTPATIENT
Start: 2022-05-02 | End: 2022-05-02 | Stop reason: SDUPTHER

## 2022-05-02 RX ORDER — SODIUM CHLORIDE 0.9 % (FLUSH) 0.9 %
5-40 SYRINGE (ML) INJECTION EVERY 12 HOURS SCHEDULED
Status: DISCONTINUED | OUTPATIENT
Start: 2022-05-02 | End: 2022-05-02 | Stop reason: HOSPADM

## 2022-05-02 RX ORDER — OXYCODONE HYDROCHLORIDE 5 MG/1
5 TABLET ORAL PRN
Status: DISCONTINUED | OUTPATIENT
Start: 2022-05-02 | End: 2022-05-02 | Stop reason: HOSPADM

## 2022-05-02 RX ORDER — ONDANSETRON 2 MG/ML
4 INJECTION INTRAMUSCULAR; INTRAVENOUS
Status: DISCONTINUED | OUTPATIENT
Start: 2022-05-02 | End: 2022-05-02 | Stop reason: HOSPADM

## 2022-05-02 RX ORDER — PROPOFOL 10 MG/ML
INJECTION, EMULSION INTRAVENOUS CONTINUOUS PRN
Status: DISCONTINUED | OUTPATIENT
Start: 2022-05-02 | End: 2022-05-02 | Stop reason: SDUPTHER

## 2022-05-02 RX ORDER — MEPERIDINE HYDROCHLORIDE 25 MG/ML
12.5 INJECTION INTRAMUSCULAR; INTRAVENOUS; SUBCUTANEOUS EVERY 5 MIN PRN
Status: DISCONTINUED | OUTPATIENT
Start: 2022-05-02 | End: 2022-05-02 | Stop reason: HOSPADM

## 2022-05-02 RX ORDER — OXYCODONE HYDROCHLORIDE 5 MG/1
5 TABLET ORAL EVERY 6 HOURS PRN
Qty: 25 TABLET | Refills: 0 | Status: SHIPPED | OUTPATIENT
Start: 2022-05-02 | End: 2022-05-09 | Stop reason: SDUPTHER

## 2022-05-02 RX ORDER — OXYCODONE HYDROCHLORIDE 5 MG/1
10 TABLET ORAL PRN
Status: DISCONTINUED | OUTPATIENT
Start: 2022-05-02 | End: 2022-05-02 | Stop reason: HOSPADM

## 2022-05-02 RX ORDER — SODIUM CHLORIDE 9 MG/ML
INJECTION, SOLUTION INTRAVENOUS PRN
Status: DISCONTINUED | OUTPATIENT
Start: 2022-05-02 | End: 2022-05-02 | Stop reason: HOSPADM

## 2022-05-02 RX ADMIN — FAMOTIDINE 20 MG: 10 INJECTION INTRAVENOUS at 06:47

## 2022-05-02 RX ADMIN — FENTANYL CITRATE 50 MCG: 50 INJECTION INTRAMUSCULAR; INTRAVENOUS at 07:32

## 2022-05-02 RX ADMIN — Medication 1500 MG: at 07:24

## 2022-05-02 RX ADMIN — FENTANYL CITRATE 50 MCG: 50 INJECTION INTRAMUSCULAR; INTRAVENOUS at 07:40

## 2022-05-02 RX ADMIN — SODIUM CHLORIDE, POTASSIUM CHLORIDE, SODIUM LACTATE AND CALCIUM CHLORIDE: 600; 310; 30; 20 INJECTION, SOLUTION INTRAVENOUS at 06:46

## 2022-05-02 RX ADMIN — Medication 10 ML: at 06:47

## 2022-05-02 RX ADMIN — SODIUM CHLORIDE, POTASSIUM CHLORIDE, SODIUM LACTATE AND CALCIUM CHLORIDE: 600; 310; 30; 20 INJECTION, SOLUTION INTRAVENOUS at 07:27

## 2022-05-02 RX ADMIN — PROPOFOL 150 MCG/KG/MIN: 10 INJECTION, EMULSION INTRAVENOUS at 07:32

## 2022-05-02 ASSESSMENT — PULMONARY FUNCTION TESTS
PIF_VALUE: 1
PIF_VALUE: 0
PIF_VALUE: 0
PIF_VALUE: 1
PIF_VALUE: 0
PIF_VALUE: 1
PIF_VALUE: 0
PIF_VALUE: 1
PIF_VALUE: 1
PIF_VALUE: 0
PIF_VALUE: 1

## 2022-05-02 ASSESSMENT — PAIN - FUNCTIONAL ASSESSMENT: PAIN_FUNCTIONAL_ASSESSMENT: 0-10

## 2022-05-02 ASSESSMENT — PAIN SCALES - GENERAL: PAINLEVEL_OUTOF10: 0

## 2022-05-02 ASSESSMENT — LIFESTYLE VARIABLES: SMOKING_STATUS: 1

## 2022-05-02 ASSESSMENT — PAIN DESCRIPTION - DESCRIPTORS: DESCRIPTORS: ACHING

## 2022-05-02 ASSESSMENT — ENCOUNTER SYMPTOMS: SHORTNESS OF BREATH: 1

## 2022-05-02 NOTE — ANESTHESIA PRE PROCEDURE
Department of Anesthesiology  Preprocedure Note       Name:  Marci Novoa   Age:  47 y.o.  :  1970                                          MRN:  [id number]         Date:  2022      Surgeon: Franki David):  Gayathri Yi MD    Procedure: Procedure(s):  OPEN VENTRAL HERNIA REPAIR WITH POSSIBLE MESH    Medications prior to admission:   Prior to Admission medications    Medication Sig Start Date End Date Taking? Authorizing Provider   oxyCODONE (ROXICODONE) 5 MG immediate release tablet Take 1 tablet by mouth every 6 hours as needed for Pain for up to 7 days. Intended supply: 7 days.  Take lowest dose possible to manage pain 22  Gayathri Yi MD   sacubitril-valsartan (ENTRESTO) 24-26 MG per tablet Take 1 tablet by mouth 2 times daily    Historical Provider, MD   acetaminophen (TYLENOL) 500 MG tablet Take 1 tablet by mouth 4 times daily as needed for Pain  Patient not taking: Reported on 2022   Matthieu Luciano PA-C   Elastic Bandages & Supports ([address]) [address] [facility] 1 each by Does not apply route daily as needed (swelling and pain)  Patient not taking: Reported on 2022 10/17/19   Matthieu Luciano PA-C   furosemide (LASIX) 40 MG tablet Take 40 mg by mouth 2 times daily  19   Historical Provider, MD   atorvastatin (LIPITOR) 40 MG tablet Take 40 mg by mouth daily  19   Historical Provider, MD   carvedilol (COREG) 6.25 MG tablet Take 6.25 mg by mouth 2 times daily (with meals)  19   Historical Provider, MD   spironolactone (ALDACTONE) 25 MG tablet Take 25 mg by mouth daily  19   Historical Provider, MD   RA ALCOHOL SWABS 70 % PADS  19   Historical Provider, MD   TRUE METRIX BLOOD GLUCOSE TEST strip  19   Historical Provider, MD   TRUEPLUS LANCETS 33G [address] [facility]  19   Historical Provider, MD   albuterol sulfate  (90 Base) MCG/ACT inhaler  3/26/19   Historical Provider, MD       Current medications:    Current Facility-Administered Medications   Medication Dose Route Frequency Provider Last Rate Last Admin    lactated ringers infusion   IntraVENous Continuous Bel Whiteside  mL/hr at 05/02/22 0646 New Bag at 05/02/22 0646    sodium chloride flush 0.9 % injection 5-40 mL  5-40 mL IntraVENous 2 times per day Bel Whiteside MD        sodium chloride flush 0.9 % injection 5-40 mL  5-40 mL IntraVENous PRN Bel Whiteside MD   10 mL at 05/02/22 0647    0.9 % sodium chloride infusion   IntraVENous PRN Bel Whiteside MD           Allergies: Allergies   Allergen Reactions    Toradol [Ketorolac Tromethamine] Anaphylaxis    Bactrim [Sulfamethoxazole-Trimethoprim] Swelling     Tongue swelling      Keflex [Cephalexin]     Ketorolac Other (See Comments)     Drowsy       Problem List:    Patient Active Problem List   Diagnosis Code    Incarcerated right inguinal hernia K40.30    Ventral hernia K43.9       Past Medical History:        Diagnosis Date    [facility]     Diabetes mellitus ([facility]     Hyperlipidemia     Hypertension     MRSA (methicillin resistant staph aureus) culture positive 7/12/019    + left leg bug bite. Past Surgical History:        Procedure Laterality Date    CARPAL TUNNEL RELEASE      CYST INCISION AND DRAINAGE      HERNIA REPAIR Right 9/13/2021    REPAIR INCARCERATED RIGHT INGUINAL HERNIA performed by Aubrey Gonzalez MD at [address]  09/13/2021    REPAIR INCARCERATED RIGHT INGUINAL HERNIA     ROTATOR CUFF REPAIR Bilateral        Social History:    Social History     Tobacco Use    Smoking status: Never Smoker    Smokeless tobacco: Current User     Types: Chew   Substance Use Topics    Alcohol use:  No                                Ready to quit: Not Answered  Counseling given: Not Answered      Vital Signs (Current):   Vitals:    04/28/22 1116 05/02/22 0628   BP:  136/77   Pulse:  73 Resp:  16   Temp:  98.2 °F (36.8 °C)   TempSrc:  Infrared   SpO2:  97%   Weight: 205 lb (93 kg) 205 lb (93 kg)   Height: 6' 1\" (1.854 m) 6' 1\" (1.854 m)                                              BP Readings from Last 3 Encounters:   05/02/22 136/77   09/13/21 (!) 78/52   09/13/21 94/62       NPO Status: Time of last liquid consumption: 0000                        Time of last solid consumption: 0000                        Date of last liquid consumption: 05/01/22                        Date of last solid food consumption: 05/01/22    BMI:   Wt Readings from Last 3 Encounters:   05/02/22 205 lb (93 kg)   09/13/21 230 lb (104.3 kg)   04/13/20 275 lb (124.7 kg)     Body mass index is 27.05 kg/m².     CBC:   Lab Results   Component Value Date    WBC 13.1 07/12/2019    RBC 4.85 07/12/2019    HGB 15.5 07/12/2019    HCT 44.9 07/12/2019    MCV 92.6 07/12/2019    RDW 14.4 07/12/2019     07/12/2019       CMP:   Lab Results   Component Value Date     09/13/2021    K 4.4 09/13/2021    K 3.7 07/12/2019    CL 96 09/13/2021    CO2 28 09/13/2021    BUN 18 09/13/2021    CREATININE 0.9 09/13/2021    GFRAA >60 09/13/2021    AGRATIO 1.3 07/12/2019    LABGLOM >60 09/13/2021    GLUCOSE 140 09/13/2021    PROT 7.5 07/12/2019    CALCIUM 8.9 09/13/2021    BILITOT 0.4 07/12/2019    ALKPHOS 80 07/12/2019    AST 19 07/12/2019    ALT 27 07/12/2019       POC Tests:   Recent Labs     05/02/22  0643   POCGLU 201*       Coags: No results found for: PROTIME, INR, APTT    HCG (If Applicable): No results found for: PREGTESTUR, PREGSERUM, HCG, HCGQUANT     ABGs: No results found for: PHART, PO2ART, OYQ4UCB, BMW6YNP, BEART, T0BKHOJK     Type & Screen (If Applicable):  No results found for: LABABO, LABRH    Drug/Infectious Status (If Applicable):  No results found for: HIV, HEPCAB    COVID-19 Screening (If Applicable):   Lab Results   Component Value Date    COVID19 Not Detected 09/08/2021           Anesthesia Evaluation  Patient summary reviewed no history of anesthetic complications:   Airway: Mallampati: II  TM distance: >3 FB   Neck ROM: full  Mouth opening: > = 3 FB Dental:          Pulmonary: breath sounds clear to auscultation  (+) shortness of breath (EXERT.):  current smoker (MARIJUANA, REGULARLY, MIDNITE LAST)    (-) COPD, asthma, recent URI and sleep apnea          Patient smoked on day of surgery. Cardiovascular:    (+) hypertension:, CHF (EF 20% , 2021):, CABA:, hyperlipidemia    (-) past MI, CAD, CABG/stent, dysrhythmias and  angina    ECG reviewed  Rhythm: regular  Rate: normal  Echocardiogram reviewed         Beta Blocker:  Dose within 24 Hrs         Neuro/Psych:   (+) neuromuscular disease (NEUROPATHY, FEET):, psychiatric history (MJ USE):   (-) seizures, TIA and CVA           GI/Hepatic/Renal:        (-) GERD, liver disease, no renal disease, bowel prep and no morbid obesity       Endo/Other:    (+) DiabetesType II DM, , no malignancy/cancer. (-) hypothyroidism, hyperthyroidism, no malignancy/cancer               Abdominal:       Abdomen: soft. Vascular: negative vascular ROS. Other Findings:           Anesthesia Plan      TIVA and general     ASA 3       Induction: intravenous. MIPS: Postoperative opioids intended and Prophylactic antiemetics administered. Anesthetic plan and risks discussed with patient. Plan discussed with CRNA. This pre-anesthesia assessment may be used as a history and physical.    DOS STAFF ADDENDUM:    Pt seen and examined, chart reviewed (including anesthesia, drug and allergy history). No interval changes to history and physical examination. Anesthetic plan, risks, benefits, alternatives, and personnel involved discussed with patient. Patient verbalized an understanding and agrees to proceed.       Shonna Gomez MD  May 2, 2022  7:03 AM      Shonna Gomez MD   5/2/2022

## 2022-05-02 NOTE — ANESTHESIA POSTPROCEDURE EVALUATION
Department of Anesthesiology  Postprocedure Note    Patient: Katarzyna Camacho  MRN: [id number]  YOB: 1970  Date of evaluation: 5/2/2022  Time:  9:06 AM     Procedure Summary     Date: 05/02/22 Room / Location: [place] [address] / [facility]    Anesthesia Start: 2256 Anesthesia Stop: 0802    Procedure: OPEN VENTRAL HERNIA REPAIR (N/A Abdomen) Diagnosis: (VENTRAL HERNIA)    Surgeons: Too Nair MD Responsible Provider: Yesica Kramer MD    Anesthesia Type: TIVA, general ASA Status: 3          Anesthesia Type: TIVA, general    Sadie Phase I: Sadie Score: 10    Sadie Phase II: Sadie Score: 10    Last vitals: Reviewed and per EMR flowsheets.      Vitals:    05/02/22 0628 05/02/22 0805 05/02/22 0810 05/02/22 0840   BP: 136/77 98/69 111/66 115/74   Pulse: 73 71 71 57   Resp: 16 18 18 16   Temp: 98.2 °F (36.8 °C) 98.2 °F (36.8 °C)     TempSrc: Infrared      SpO2: 97% 95% 96% 95%   Weight: 205 lb (93 kg)      Height: 6' 1\" (1.854 m)        Anesthesia Post Evaluation    Patient location during evaluation: bedside  Patient participation: complete - patient participated  Level of consciousness: awake and alert  Airway patency: patent  Nausea & Vomiting: no nausea  Cardiovascular status: hemodynamically stable  Respiratory status: acceptable  Hydration status: euvolemic

## 2022-05-02 NOTE — H&P
Department of General Surgery - Adult  Surgical Service   Attending History and Physical        CHIEF COMPLAINT:  Ventral hernia      History Obtained From:  patient    HISTORY OF PRESENT ILLNESS:    This patient is a 47 y.o. male who presents with need for ventral hernia repair. Past Medical History:        Diagnosis Date    [facility]estive [facility]) [facility]     Diabetes mellitus ([facility]     Hyperlipidemia     Hypertension     MRSA (methicillin resistant staph aureus) culture positive 7/12/019    + left leg bug bite.      Past Surgical History:        Procedure Laterality Date    CARPAL TUNNEL RELEASE      CYST INCISION AND DRAINAGE      HERNIA REPAIR Right 9/13/2021    REPAIR INCARCERATED RIGHT INGUINAL HERNIA performed by Padma Peters MD at [address] [place] [address]  09/13/2021    REPAIR INCARCERATED RIGHT INGUINAL HERNIA     ROTATOR CUFF REPAIR Bilateral      Current Medications:  Current Facility-Administered Medications   Medication Dose Route Frequency Provider Last Rate Last Admin    lactated ringers infusion   IntraVENous Continuous Galina Villareal  mL/hr at 05/02/22 0646 New Bag at 05/02/22 0646    sodium chloride flush 0.9 % injection 5-40 mL  5-40 mL IntraVENous 2 times per day Galina Villareal MD        sodium chloride flush 0.9 % injection 5-40 mL  5-40 mL IntraVENous PRN Galina Villareal MD   10 mL at 05/02/22 0647    0.9 % sodium chloride infusion   IntraVENous PRN Galina Villareal MD        sodium chloride flush 0.9 % injection 5-40 mL  5-40 mL IntraVENous 2 times per day Galina Villareal MD        sodium chloride flush 0.9 % injection 5-40 mL  5-40 mL IntraVENous PRN Galina Villareal MD        0.9 % sodium chloride infusion  25 mL IntraVENous PRN Galina Villareal MD        meperidine (DEMEROL) injection 12.5 mg  12.5 mg IntraVENous Q5 Min PRN Galina Villareal MD        HYDROmorphone (DILAUDID) injection 0.25 mg  0.25 mg IntraVENous Q5 Min FELI Patel MD        HYDROmorphone (DILAUDID) injection 0.5 mg  0.5 mg IntraVENous Q5 Min FELI Patel MD        oxyCODONE (ROXICODONE) immediate release tablet 5 mg  5 mg Oral FELI Patel MD        Or    oxyCODONE (ROXICODONE) immediate release tablet 10 mg  10 mg Oral PRN Ellie Patel MD        ondansetron [facility]) injection 4 mg  4 mg IntraVENous Once PRGABBIE Patel MD        vancomycin 1500 mg in dextrose 5% 300 mL IVPB  1,500 mg IntraVENous Once Gayathri Yi MD         Home Medications:  Prior to Admission medications    Medication Sig Start Date End Date Taking? Authorizing Provider   oxyCODONE (ROXICODONE) 5 MG immediate release tablet Take 1 tablet by mouth every 6 hours as needed for Pain for up to 7 days. Intended supply: 7 days.  Take lowest dose possible to manage pain 4/25/22 5/2/22  Gayathri Yi MD   sacubitril-valsartan (ENTRESTO) 24-26 MG per tablet Take 1 tablet by mouth 2 times daily    Historical Provider, MD   acetaminophen (TYLENOL) 500 MG tablet Take 1 tablet by mouth 4 times daily as needed for Pain  Patient not taking: Reported on 5/2/2022 1/12/20   Matthieu Luciano PA-C   Elastic Bandages & Supports ([facility]) [address] [facility] [address] 1 each by Does not apply route daily as needed (swelling and pain)  Patient not taking: Reported on 5/2/2022 10/17/19   Matthieu Luciano PA-C   furosemide (LASIX) 40 MG tablet Take 40 mg by mouth 2 times daily  2/6/19   Historical Provider, MD   atorvastatin (LIPITOR) 40 MG tablet Take 40 mg by mouth daily  6/17/19   Historical Provider, MD   carvedilol (COREG) 6.25 MG tablet Take 6.25 mg by mouth 2 times daily (with meals)  6/17/19   Historical Provider, MD   spironolactone (ALDACTONE) 25 MG tablet Take 25 mg by mouth daily  6/17/19   Historical Provider, MD   RA ALCOHOL SWABS 70 % PADS  4/21/19   Historical Provider, MD   TRUE METRIX BLOOD GLUCOSE TEST strip  6/14/19   Historical Provider, MD   TRUEPLUS LANCETS 33G [address] [facility]  4/21/19   Historical Provider, MD   albuterol sulfate  (90 Base) MCG/ACT inhaler  3/26/19   Historical Provider, MD     Allergies: Toradol [ketorolac tromethamine], Bactrim [sulfamethoxazole-trimethoprim], Keflex [cephalexin], and Ketorolac      Social History:   TOBACCO:   reports that he has never smoked. His smokeless tobacco use includes chew. ETOH:   reports no history of alcohol use. Family History:   History reviewed. No pertinent family history. REVIEW OF SYSTEMS:    Patient reports no complaints related to eyes, ears, nose , throat or mouth. No chest pain or SOB. No urinary complaints or musculoskeletal complaints. No skin rashes, bleeding tendencies. Current GI complaints  Abdominal pain. PHYSICAL EXAM:    VITALS:  /77   Pulse 73   Temp 98.2 °F (36.8 °C) (Infrared)   Resp 16   Ht 6' 1\" (1.854 m)   Wt 205 lb (93 kg)   SpO2 97%   BMI 27.05 kg/m²   Comfortable  Eyes:  No scleral icterus  Mouth:  Mucus membranes moist  Skin:  No rashes  Chest:  CTA  Heart:  Regular  Abdomen:Ventral hernia present. Extremities:  No edema  Neurologic:  No focal deficits  Psychiatric : AAA O x 3          ASSESSMENT:   Incarcerated ventral hernia    Plan:    The diagnosis and recommended procedure were explained. Questions answered. Prepare for surgery.

## 2022-05-02 NOTE — PROGRESS NOTES
Reviewed discharge instructions with patient and sister Demarcus Andres, they verbalized understanding. Patient states he is ready for discharge.

## 2022-05-02 NOTE — BRIEF OP NOTE
Brief Postoperative Note      Patient: Andreas Connolly  YOB: 1970  MRN: [id number]    Date of Procedure: 5/2/2022    Pre-Op Diagnosis: INCARCERATED VENTRAL HERNIA    Post-Op Diagnosis: Same       Procedure(s):  OPEN VENTRAL HERNIA REPAIR    Surgeon(s):  Ruben Godwin MD    Assistant:  Surgical Assistant: Makenzie Robertson    Anesthesia: Monitor Anesthesia Care    Estimated Blood Loss (mL): Minimal    Complications: None    Specimens:   * No specimens in log *    Implants:  * No implants in log *      Drains: * No LDAs found *    Findings: As above    Electronically signed by Akhil Menjivar MD on 5/2/2022 at 8:04 AM

## 2022-05-05 ENCOUNTER — TELEPHONE (OUTPATIENT)
Dept: SURGERY | Age: 52
End: 2022-05-05

## 2022-05-05 NOTE — TELEPHONE ENCOUNTER
Patient states he has been nauseated and vomiting since his surgery on Monday 5/2/2022. He states as soon as he eats or drinks anything, within 1/2 hour he vomits it all back up. He states he has been trying to eat bland food, but even water makes him sick. He states today he has vomited twice so far. States he can not keep anything down. His stomach hurts from being so sick. He denies fever. He uses [facility] in [facility]. Please advise.

## 2022-05-05 NOTE — OP NOTE
[address]                 [address] [address]                                OPERATIVE REPORT    PATIENT NAME: Mario Pringle                   :        1970  MED REC NO:   [id number]                          ROOM:  ACCOUNT NO:   [de-identified]                           ADMIT DATE: 2022  PROVIDER:     Phoebe Reyes MD    DATE OF PROCEDURE:  2022    PREOPERATIVE DIAGNOSIS:  Incarcerated ventral hernia. POSTOPERATIVE DIAGNOSIS:  Incarcerated ventral hernia. OPERATION PERFORMED:  Open primary repair of incarcerated ventral  hernia. SURGEON:  Phoebe Reyes MD    ANESTHESIA:  Total intravenous anesthesia. COMPLICATIONS:  None. BLOOD LOSS:  Less than 50 mL. INDICATIONS FOR THE OPERATION:  A 62-year-old male with a palpable mass  and tenderness in the supraumbilical area. This was worsening. I  reviewed CT imaging that suggested incarcerated fat-containing ventral  hernia. The patient understood the risks and benefits of surgery. He  wanted to proceed. DESCRIPTION OF OPERATION:  The patient was brought to the operating  room. Total intravenous anesthesia was initiated. He was prepped and  draped in usual surgical sterile fashion. Local anesthetic was  infiltrated. An incision was made in the supraumbilical area. I  dissected down. I found incarcerated preperitoneal fat that had  mushroomed through a very small less than 1-cm defect. The fat was  inflamed. I removed the incarcerated fat. I cleared off the fascia. I  used interrupted 0 Ethilon sutures to reapproximate the small fascial  defect in a primary fashion. Hemostasis was confirmed. 3-0 Vicryl was  used to reapproximate subcutaneous tissues. 4-0 Vicryl was used to  reapproximate the skin. Benzoin and Steri-Strip dressing were placed. DISPOSITION:  The patient tolerated the procedure without any acute  complication.         RALPH CARRASCO MD    D: 05/05/2022 7:19:03       T: 05/05/2022 7:22:42     [email or web address]  Job#: [id number]     Doc#: [id number]    CC:

## 2022-05-05 NOTE — TELEPHONE ENCOUNTER
Pt called in and stated that he cannot stop vomiting, he said he cannot keep anything down. Liquids and food. Denies fever.  Asked for a call back [phone]

## 2022-05-09 ENCOUNTER — TELEPHONE (OUTPATIENT)
Dept: SURGERY | Age: 52
End: 2022-05-09

## 2022-05-09 DIAGNOSIS — K40.90 NON-RECURRENT UNILATERAL INGUINAL HERNIA WITHOUT OBSTRUCTION OR GANGRENE: ICD-10-CM

## 2022-05-09 RX ORDER — OXYCODONE HYDROCHLORIDE 5 MG/1
5 TABLET ORAL EVERY 6 HOURS PRN
Qty: 20 TABLET | Refills: 0 | Status: SHIPPED | OUTPATIENT
Start: 2022-05-09 | End: 2022-05-16

## 2022-05-09 NOTE — TELEPHONE ENCOUNTER
Pt called in and stated that he is about out of pain medication. He is requesting a refill due to still being in pain. Pharmacy is listed below. [facility], [address] [place] [address] [phone] - F [phone]   [address] [place] [address]   Phone:  [phone]  Fax:  [phone]    Pt is also asking if he is able to take off bandages and wash the stitches.

## 2022-05-16 ENCOUNTER — TELEPHONE (OUTPATIENT)
Dept: SURGERY | Age: 52
End: 2022-05-16

## 2022-05-16 NOTE — TELEPHONE ENCOUNTER
PT called with complaints of pain from hernia repair 5-2. Pt stated he fell between a camper and a chair a few days ago and he was having pain. He stated his friend who is a nurse \"checked him out\" I offered pt numerous appointments including today, and provided to number for [facility]. He said they were too far and he was going to smash his phone. He said he will call [facility] to set up an appt. Pt was advised that if anything was to worsen to go to ER or call office back.     Pt is requesting a refill Oxycodone and something for nausea    [facility]

## 2022-05-25 ENCOUNTER — TELEPHONE (OUTPATIENT)
Dept: SURGERY | Age: 52
End: 2022-05-25

## 2022-05-25 NOTE — TELEPHONE ENCOUNTER
Pt called in stating that he is having severe pain still from his hernia surgery that was on 5/2/22. Cliff So he is not running a fever but is vomiting often. He stated that he has not fallen or anything to make himself feel this way, but in a previous message from Philippe when she spoke to him on 5/16/22 he told her he fell between a camper and a chair. Pt said he is taking nausea pills but they are not helping at all. [facility] something is to be [facility].

## 2022-05-25 NOTE — TELEPHONE ENCOUNTER
I called patient. We discussed per previous message 5/16/2022, he was to transition to OTC pain medication. Per Jeannie Blevins, he stated that he did not fall or anything, but then he told me he \"was fishing a few days ago and fell hard\", and hurt his toe and side of his foot. We discussed he will can call his PCP for issues with his foot. He then states he has a knot where his incision was at and is makes him \" feel really weird\". We discussed he can make appt to have the area checked, so he is going to call [facility] clinic for tomorrow. He states he is nauseated as soon as he wakes up and the nausea lasts all day. He states Zofran does not help. Please advise. He uses [facility] in [place].

## (undated) DEVICE — SUTURE VCRL SZ 0 L27IN ABSRB UD L26MM CT-2 1/2 CIR J270H

## (undated) DEVICE — NEEDLE HYPO 25GA L1.5IN BLU POLYPR HUB S STL REG BVL STR

## (undated) DEVICE — GOWN,AURORA,NONREINF,RAGLAN,XXL,STERILE: Brand: MEDLINE

## (undated) DEVICE — GLOVE ORANGE PI 8 1/2   MSG9085

## (undated) DEVICE — 3M™ STERI-STRIP™ COMPOUND BENZOIN TINCTURE 40 BAGS/CARTON 4 CARTONS/CASE C1544: Brand: 3M™ STERI-STRIP™

## (undated) DEVICE — SUTURE VCRL SZ 4-0 L18IN ABSRB UD L19MM PS-2 3/8 CIR PRIM J496H

## (undated) DEVICE — CANNULA NSL O2 AD 7 FT TBNG SFT TCH STD CONN N FLARED PRNG

## (undated) DEVICE — 3M™ TEGADERM™ TRANSPARENT FILM DRESSING FRAME STYLE, 1627, 4 IN X 10 IN (10 CM X 25 CM), 20/CT 4CT/CASE: Brand: 3M™ TEGADERM™

## (undated) DEVICE — ELECTRODE PT RET AD L9FT HI MOIST COND ADH HYDRGEL CORDED

## (undated) DEVICE — SYRINGE MED 10ML LUERLOCK TIP W/O SFTY DISP

## (undated) DEVICE — APPLICATOR MEDICATED 26 CC SOLUTION HI LT ORNG CHLORAPREP

## (undated) DEVICE — GOWN SIRUS NONREIN XL W/TWL: Brand: MEDLINE INDUSTRIES, INC.

## (undated) DEVICE — MAJOR SET UP PK

## (undated) DEVICE — CANNULA NSL 13FT TUBE AD ETCO2 DIV SAMP M

## (undated) DEVICE — DRAIN SURG L18IN DIA025IN 100% SIL RADPQ FOR CLS WND DRNAGE

## (undated) DEVICE — SUTURE PROL SZ 2-0 L30IN NONABSORBABLE BLU L26MM CT-2 1/2 8411H

## (undated) DEVICE — SUTURE VCRL SZ 3-0 L18IN ABSRB UD L26MM SH 1/2 CIR J864D

## (undated) DEVICE — 3M™ STERI-STRIP™ REINFORCED ADHESIVE SKIN CLOSURES, R1540, 1/8 IN X 3 IN (3 MM X 75 MM), 5 STRIPS/ENVELOPE: Brand: 3M™ STERI-STRIP™

## (undated) DEVICE — SUTURE PERMA-HAND SZ 2-0 L30IN NONABSORBABLE BLK L26MM SH K833H

## (undated) DEVICE — SOLUTION IV IRRIG 500ML 0.9% SODIUM CHL 2F7123

## (undated) DEVICE — GAUZE,SPONGE,4"X4",8PLY,STRL,LF,10/TRAY: Brand: MEDLINE

## (undated) DEVICE — SURGICAL PROCEDURE PACK IV U-BAR

## (undated) DEVICE — PACK,UNIVERSAL,SPLIT,II,AURORA: Brand: MEDLINE

## (undated) DEVICE — APPLICATOR PREP 26ML 0.7% IOD POVACRYLEX 74% ISO ALC ST